# Patient Record
Sex: MALE | Race: WHITE
[De-identification: names, ages, dates, MRNs, and addresses within clinical notes are randomized per-mention and may not be internally consistent; named-entity substitution may affect disease eponyms.]

---

## 2018-07-16 ENCOUNTER — HOSPITAL ENCOUNTER (OUTPATIENT)
Dept: HOSPITAL 76 - SC | Age: 53
Discharge: HOME | End: 2018-07-16
Attending: INTERNAL MEDICINE
Payer: COMMERCIAL

## 2018-07-16 DIAGNOSIS — G47.00: ICD-10-CM

## 2018-07-16 DIAGNOSIS — G47.33: Primary | ICD-10-CM

## 2018-07-16 PROCEDURE — 99212 OFFICE O/P EST SF 10 MIN: CPT

## 2018-07-16 PROCEDURE — 99213 OFFICE O/P EST LOW 20 MIN: CPT

## 2018-10-25 ENCOUNTER — HOSPITAL ENCOUNTER (OUTPATIENT)
Dept: HOSPITAL 76 - SC | Age: 53
Discharge: HOME | End: 2018-10-25
Attending: NURSE PRACTITIONER
Payer: COMMERCIAL

## 2018-10-25 DIAGNOSIS — G47.00: ICD-10-CM

## 2018-10-25 DIAGNOSIS — G47.33: Primary | ICD-10-CM

## 2018-10-25 PROCEDURE — 99214 OFFICE O/P EST MOD 30 MIN: CPT

## 2018-10-25 PROCEDURE — 99212 OFFICE O/P EST SF 10 MIN: CPT

## 2019-12-05 ENCOUNTER — HOSPITAL ENCOUNTER (OUTPATIENT)
Dept: HOSPITAL 76 - SC | Age: 54
Discharge: HOME | End: 2019-12-05
Attending: NURSE PRACTITIONER
Payer: COMMERCIAL

## 2019-12-05 VITALS — SYSTOLIC BLOOD PRESSURE: 106 MMHG | DIASTOLIC BLOOD PRESSURE: 62 MMHG

## 2019-12-05 DIAGNOSIS — G47.33: Primary | ICD-10-CM

## 2019-12-05 DIAGNOSIS — G47.00: ICD-10-CM

## 2019-12-05 PROCEDURE — 99212 OFFICE O/P EST SF 10 MIN: CPT

## 2019-12-05 PROCEDURE — 99214 OFFICE O/P EST MOD 30 MIN: CPT

## 2019-12-05 NOTE — SLEEP CARE CONSULTATION
Information from patient questionnaire entered by Valeria White.





I have reviewed and concur with the information entered by Valeria White. This 

document represents the service I personally performed and the decisions made by

me, Taylor Walter, RN, MSN, ARNP.





History of Present Illness


Previous diagnosis: Mild, Obstructive Sleep Apnea-Hypopnea Syndrome


AHI: 11.5


Reason for follow up: annual


Accompanied by: Spouse


Equipment type: CPAP


Equipment obtained from: [facility] (difficulty getting supplies despite 

repeated attempts and not ready when told)


Mask style: Full face


Mask brand: Resmed


Backup mask available: Yes


Last cushion change: 6 weeks ago 


Prior sleep studies: Yes





CPAP Compliance Data





- Data Reviewed with Patient


Average duration of nightly device use: 8H 7M


Compliance rate %: 99.4


Current pressure setting (cmH2O): 7-11


Humidity setting: off


Heated hose setting: 3


Average residual AHI: 3.1


Average large leak: 24s





Subjective


Patient concerns: reports: nasal congestion (chronic that is not interferring  

with use of CPAP. He is aware to use saline nasal spray as needed. ), other 

(insomnia due to difficulty to maintain sleep. Wakes about 1-2 hours later, and 

scratches. It could take about 30-60 minutes to fall asleep. Clock is covered. 

He is waking 2-3 times a night. Unknown reason or feels too hot. Has room fan. 

).  denies: aerophagia, mask discomfort, air blowing in eyes, mask leak noise, 

condensation in mask/hose, dry mouth, nose, throat, epistaxis


Current pressure setting perceived as: comfortable


On therapy, patient: reports: sleeping better (cant sleep without it. ), 

awakening more refreshed, being more awake and alert during the day, more rested

overall.  denies: drowsiness while driving


Initial [place] Sleepiness Scale score: 7


Current [place] Sleepiness Scale score: 7





Allergies and Home Medications


Known drug allergies: Yes (lisinopril - a statin )


Home medication list reviewed: Yes


Allergy and home medication list: 





         Medication Name (generic/name brand)   Strength & Dosage











Losartan Potassium 100mg tab one daily


 


Lantus 50U daily


 


Metformin 1000mg tab one twice daily


 


Aspirin 81mg tab one daily


 


Novolog 15U daily


 


Jardiance 10mg daily


 


Atorvastatin  10mg HS 


 


Trulicity 3mg weekly


 


multivitamin daily '


 


zyrtec 10mg daily 





Ambien  5mg prn ( 1-2 times a week) 


 


Flonase  2 sprays each nostril daily











Review of Systems


Review of systems same as previous: Yes





Physical Exam


Blood Pressure: 106/62


Cuff size: long


Heart Rate: 76


O2 Saturation: 98


Height: 5 ft 10 in


Weight: 203 lb 6.4 oz


Body Mass Index: 29.2


BMI Classification: Overweight





Impression and Plan





1. Obstructive Sleep Apnea-Hypopnea Syndrome, mild but moderate supine, with 

good treatment compliance and good apnea control. On CPAP therapy, the patient 

has better sleep quality and is more rested overall. Patient having difficulty 

getting supplies, just [facility] and would [facility] 

[facility]. Thus a [facility] prescription will be made.  To reduce mask being 

pulled or CPAP being pulled  or hose getting around  his neck when he tosses and

turns, I showed him a hose ly that can be bought on line.   The sinus 

headaches from cold CPAP air has resolved with use of heated hose with new CPAP 

the past year. Patient's apnea severity and rationale for treatment to reduce 

apnea, improve sleep quality and reduce cardiovascular and cerebrovascular 

events was reviewed. I also reviewed the benefit of consistent device use of 

CPAP for hypertension, diabetes. Since apnea more severe supine, he is advised 

to avoid supine sleep if unable to use CPAP.


2. Insomnia,  difficulty to maintain sleep for unknown reason. Takes Ambien 5mg 

1-2 times a week. Is aware of 8 hour rule not to drive after ingesting. I also 

discussed possible adverse reactions such as sleep driving, sleep eating and 

compulsive behaviour such as gambling, buying etc which he denies is occurring. 

Thus a sleep diary will be completed over a month for evaluation upon  follow 

up.  





* Continue CPAP pressure at 7-11 cmH2O


* [facility]


* Hose ly


* Sleep diaries - 4 weeks 


* Notify me if snoring with mask or feeling that the pressure is too much or too

  little


* Attempt to lose weight - discussed how affects apnea and health 


* Return for follow up in   , or sooner if concerns arise








I spent 100% of this 35 minute visit face to face with the patient with greater 

than 50% of this was spent time counseling the patient and coordination of care.

## 2020-02-13 ENCOUNTER — HOSPITAL ENCOUNTER (OUTPATIENT)
Dept: HOSPITAL 76 - SC | Age: 55
Discharge: HOME | End: 2020-02-13
Attending: NURSE PRACTITIONER
Payer: COMMERCIAL

## 2020-02-13 VITALS — SYSTOLIC BLOOD PRESSURE: 110 MMHG | DIASTOLIC BLOOD PRESSURE: 70 MMHG

## 2020-02-13 DIAGNOSIS — G47.33: Primary | ICD-10-CM

## 2020-02-13 DIAGNOSIS — E66.3: ICD-10-CM

## 2020-02-13 PROCEDURE — 99212 OFFICE O/P EST SF 10 MIN: CPT

## 2020-02-13 PROCEDURE — 99214 OFFICE O/P EST MOD 30 MIN: CPT

## 2020-02-13 NOTE — SLEEP CARE CONSULTATION
Information from patient questionnaire entered by Rosita Evans.





I have reviewed and concur with the information entered by Rosita Evans. 

This document represents the service I personally performed and the decisions 

made by me, Taylor Walter, RN, MSN, ARNP.





History of Present Illness


Previous diagnosis: Mild, Obstructive Sleep Apnea-Hypopnea Syndrome


AHI: 11.5


Reason for follow up: other (2 month with sleep diaries)


Accompanied by: Spouse


Equipment type: CPAP


[facility]


Mask style: Full face


Mask brand: Resmed


Backup mask available: Yes


Last cushion change: 3 months ago 


HPI additional information: 


 [facility] with [facility] . He has not been able to 

get supplies from [facility] for 3 months. 


  Sleep diary completed. 


The last 2-3 weeks he has slept well and is pleased. 


Changes made since then is that he has he stopped Ambien ( only uses 

occasionally if unable to sleep well a couple of nights).


He is sleeping on his back more as noted by spouse and patient.


He continues to have left shoulder pain and under evaluation. He had a steroid 

shot 2 weeks ago with no reduction in pain noted.  MRI ordered. PT ordered to be

restarted after MRI. 





Sleep diaries reviewed, none the past few weeks as sleeping well. He still wakes

at night but now but able to fall back to sleep with no problem even if shoulder

pain.





He generally wakes about 6:30--6:45am and stay in bed until 7:15-7:30 am with 

alarm so not to disturb sleep his spouse as she states she is a light sleeper 

and she concurs.  He was sleeping later for a week and felt more refreshed and 

it appears he had more sleep ranging from  7 - 8 hours 





His bedtime is about 2320 after the news and states the news does not bother his

sleep. 





CPAP Compliance Data





- Data Reviewed with Patient


Average duration of nightly device use: 8.45


Compliance rate %: 100 (60 days)


Current pressure setting (cmH2O): 7-11


Humidity settin


Heated hose setting: 3


Average residual AHI: 3.2


Average large leak: 47 sec





Subjective


Patient concerns: reports: nasal congestion (chronic  - not interferring with 

CPAP use. In fact nasal congestion less with CPAP. ).  denies: aerophagia, mask 

discomfort, air blowing in eyes, mask leak noise, condensation in mask/hose, dry

mouth, nose, throat, epistaxis


Observed to snore while using device: No


Current pressure setting perceived as: comfortable


On therapy, patient: reports: sleeping better, awakening more refreshed, being 

more awake and alert during the day, more rested overall.  denies: drowsiness 

while driving


Initial [place] Sleepiness Scale score: 14


Current [place] Sleepiness Scale score: 4





Allergies and Home Medications


Known drug allergies: Yes (lisinopril )


Home medication list reviewed: Yes (no changes since last visit)





Review of Systems


Review of systems same as previous: Yes





Physical Exam


Blood Pressure: 110/70


Cuff size: regular


Heart Rate: 68


O2 Saturation: 98


Height: 5 ft 10 in


Weight: 200 lb 12.8 oz


Weight change since last visit: lost 3 pounds 


Body Mass Index: 28.8


BMI Classification: Overweight





Impression and Plan





1. Obstructive Sleep Apnea-Hypopnea Syndrome, mild, with good treatment 

compliance and good apnea control. On CPAP therapy, the patient has better sleep

quality and is more rested overall. To get supplies until [facility], I 

wrote an updated prescription to [facility]. He has lost weight. He is 

overweight and  planning on losing about 15 pounds which would bring his BMI 

down to 26-27 and reduce apnea risk and pressure requirements as well as benefit

his blood pressure and diabetes. His current pressure range should accomodate 

his weight loss goal. However, symptoms to report discussed  and to contact me 

if pressure needs further adjustment. Patient's apnea severity and rationale for

treatment to reduce apnea, improve sleep quality and reduce cardiovascular and 

cerebrovascular events was reviewed. I also reviewed the benefit of consistent 

device use of CPAP for hypertension, diabetes .


2. Insomnia , currently resolved for unknown reason or by product of steroid 

injection in reducing pain at night for shoulder though patient states he 

started sleeping better the week before. Thus emphasized importance of a regular

sleep schedule and sufficient sleep. Homeostatic sleep drive and circadian 

rhythm explained  and how benefits sleep efficiency. If further insomnia, then 

re-evaluation with sleep diaries as indicated. 


* 


* Continue CPAP pressure to 7-11  cmH2O


* Notify me if snoring with mask or feeling that the pressure is too much or too

  little


* Prescription to [facility] until [facility] coverage. 


* Continue to lose weight


* Call this office if any problems using CPAP


* Return for follow up in 1 year  , or sooner if concerns arise





Time Spent with Patient (minutes): 35





I spent 100% of this visit face to face with the patient with greater than 50% 

of this was spent time counseling the patient and coordination of care.

## 2021-05-23 ENCOUNTER — HOSPITAL ENCOUNTER (INPATIENT)
Dept: HOSPITAL 76 - ED | Age: 56
LOS: 2 days | Discharge: HOME | DRG: 639 | End: 2021-05-25
Attending: SPECIALIST | Admitting: INTERNAL MEDICINE
Payer: COMMERCIAL

## 2021-05-23 DIAGNOSIS — K59.00: ICD-10-CM

## 2021-05-23 DIAGNOSIS — Z90.5: ICD-10-CM

## 2021-05-23 DIAGNOSIS — E78.00: ICD-10-CM

## 2021-05-23 DIAGNOSIS — Z87.448: ICD-10-CM

## 2021-05-23 DIAGNOSIS — R10.13: ICD-10-CM

## 2021-05-23 DIAGNOSIS — Z79.899: ICD-10-CM

## 2021-05-23 DIAGNOSIS — Z80.51: ICD-10-CM

## 2021-05-23 DIAGNOSIS — E11.10: Primary | ICD-10-CM

## 2021-05-23 DIAGNOSIS — Z87.891: ICD-10-CM

## 2021-05-23 DIAGNOSIS — Z79.4: ICD-10-CM

## 2021-05-23 DIAGNOSIS — Z79.82: ICD-10-CM

## 2021-05-23 DIAGNOSIS — K21.9: ICD-10-CM

## 2021-05-23 DIAGNOSIS — E11.43: ICD-10-CM

## 2021-05-23 DIAGNOSIS — G47.30: ICD-10-CM

## 2021-05-23 DIAGNOSIS — K31.84: ICD-10-CM

## 2021-05-23 LAB
ALBUMIN DIAFP-MCNC: 4.1 G/DL (ref 3.2–5.5)
ALBUMIN/GLOB SERPL: 1 {RATIO} (ref 1–2.2)
ALP SERPL-CCNC: 86 IU/L (ref 42–121)
ALT SERPL W P-5'-P-CCNC: 19 IU/L (ref 10–60)
AMPHET UR QL SCN: NEGATIVE
ANION GAP SERPL CALCULATED.4IONS-SCNC: 14 MMOL/L (ref 6–13)
ANION GAP SERPL CALCULATED.4IONS-SCNC: 18 MMOL/L (ref 6–13)
ANION GAP SERPL CALCULATED.4IONS-SCNC: 21 MMOL/L (ref 6–13)
ANION GAP SERPL CALCULATED.4IONS-SCNC: 23 MMOL/L (ref 6–13)
AST SERPL W P-5'-P-CCNC: 13 IU/L (ref 10–42)
B PARAPERT DNA SPEC QL NAA+PROBE: NOT DETECTED
B PERT DNA SPEC QL NAA+PROBE: NOT DETECTED
BARBITURATES UR QL SCN>300 NG/ML: NEGATIVE
BASE EXCESS BLDV CALC-SCNC: -17.2 MMOL/L
BASE EXCESS BLDV CALC-SCNC: -23 MMOL/L
BASOPHILS NFR BLD AUTO: 0 10^3/UL (ref 0–0.1)
BASOPHILS NFR BLD AUTO: 0.3 %
BENZODIAZ UR QL SCN: NEGATIVE
BILIRUB BLD-MCNC: 2 MG/DL (ref 0.2–1)
BILIRUB UR QL CFM: NEGATIVE
BUN SERPL-MCNC: 35 MG/DL (ref 6–20)
BUN SERPL-MCNC: 36 MG/DL (ref 6–20)
BUN SERPL-MCNC: 38 MG/DL (ref 6–20)
BUN SERPL-MCNC: 39 MG/DL (ref 6–20)
C PNEUM DNA NPH QL NAA+NON-PROBE: NOT DETECTED
CALCIUM UR-MCNC: 10.2 MG/DL (ref 8.5–10.3)
CALCIUM UR-MCNC: 9 MG/DL (ref 8.5–10.3)
CALCIUM UR-MCNC: 9.3 MG/DL (ref 8.5–10.3)
CALCIUM UR-MCNC: 9.6 MG/DL (ref 8.5–10.3)
CASTS URNS QL MICRO: (no result) /LPF
CHLORIDE SERPL-SCNC: 102 MMOL/L (ref 101–111)
CHLORIDE SERPL-SCNC: 106 MMOL/L (ref 101–111)
CHLORIDE SERPL-SCNC: 107 MMOL/L (ref 101–111)
CHLORIDE SERPL-SCNC: 109 MMOL/L (ref 101–111)
CLARITY UR REFRACT.AUTO: (no result)
CO2 BLDV-SCNC: 8 MMOL/L (ref 24–29)
CO2 BLDV-SCNC: 9.6 MMOL/L (ref 24–29)
CO2 SERPL-SCNC: 6 MMOL/L (ref 21–32)
CO2 SERPL-SCNC: 8 MMOL/L (ref 21–32)
COCAINE UR-SCNC: NEGATIVE UMOL/L
CREAT SERPLBLD-SCNC: 1.1 MG/DL (ref 0.6–1.2)
CREAT SERPLBLD-SCNC: 1.2 MG/DL (ref 0.6–1.2)
CREAT SERPLBLD-SCNC: 1.3 MG/DL (ref 0.6–1.2)
CREAT SERPLBLD-SCNC: 1.5 MG/DL (ref 0.6–1.2)
EOSINOPHIL # BLD AUTO: 0 10^3/UL (ref 0–0.7)
EOSINOPHIL NFR BLD AUTO: 0 %
ERYTHROCYTE [DISTWIDTH] IN BLOOD BY AUTOMATED COUNT: 13.2 % (ref 12–15)
FLUAV RNA RESP QL NAA+PROBE: NOT DETECTED
GFRSERPLBLD MDRD-ARVRAT: 48 ML/MIN/{1.73_M2} (ref 89–?)
GFRSERPLBLD MDRD-ARVRAT: 57 ML/MIN/{1.73_M2} (ref 89–?)
GFRSERPLBLD MDRD-ARVRAT: 63 ML/MIN/{1.73_M2} (ref 89–?)
GFRSERPLBLD MDRD-ARVRAT: 69 ML/MIN/{1.73_M2} (ref 89–?)
GLOBULIN SER-MCNC: 4.3 G/DL (ref 2.1–4.2)
GLUCOSE SERPL-MCNC: 166 MG/DL (ref 70–100)
GLUCOSE SERPL-MCNC: 226 MG/DL (ref 70–100)
GLUCOSE SERPL-MCNC: 322 MG/DL (ref 70–100)
GLUCOSE SERPL-MCNC: 353 MG/DL (ref 70–100)
GLUCOSE UR QL STRIP.AUTO: 500 MG/DL
HAEM INFLU B DNA SPEC QL NAA+PROBE: NOT DETECTED
HCO3 BLDMV-SCNC: 6.8 MMOL/L (ref 23–28)
HCO3 BLDMV-SCNC: 8.9 MMOL/L (ref 23–28)
HCOV 229E RNA SPEC QL NAA+PROBE: NOT DETECTED
HCOV HKU1 RNA UPPER RESP QL NAA+PROBE: NOT DETECTED
HCOV NL63 RNA ASPIRATE QL NAA+PROBE: NOT DETECTED
HCOV OC43 RNA SPEC QL NAA+PROBE: NOT DETECTED
HCT VFR BLD AUTO: 46.2 % (ref 42–52)
HGB UR QL STRIP: 14.8 G/DL (ref 14–18)
HMPV AG SPEC QL: NOT DETECTED
HPIV1 RNA NPH QL NAA+PROBE: NOT DETECTED
HPIV2 SPEC QL CULT: NOT DETECTED
HPIV3 AB TITR SER CF: NOT DETECTED {TITER}
HPIV4 RNA SPEC QL NAA+PROBE: NOT DETECTED
KETONES SERPL STRIP-SCNC: (no result) MMOL/L
KETONES SERPL STRIP-SCNC: (no result) MMOL/L
KETONES UR QL STRIP.AUTO: >=80 MG/DL
LIPASE SERPL-CCNC: 71 U/L (ref 22–51)
LYMPHOCYTES # SPEC AUTO: 0.9 10^3/UL (ref 1.5–3.5)
LYMPHOCYTES NFR BLD AUTO: 7.5 %
M PNEUMO DNA SPEC QL NAA+PROBE: NOT DETECTED
MAGNESIUM SERPL-MCNC: 2.3 MG/DL (ref 1.7–2.8)
MAGNESIUM SERPL-MCNC: 2.5 MG/DL (ref 1.7–2.8)
MAGNESIUM SERPL-MCNC: 2.6 MG/DL (ref 1.7–2.8)
MAGNESIUM SERPL-MCNC: 2.7 MG/DL (ref 1.7–2.8)
MCH RBC QN AUTO: 28 PG (ref 27–31)
MCHC RBC AUTO-ENTMCNC: 32 G/DL (ref 32–36)
MCV RBC AUTO: 87.5 FL (ref 80–94)
METHADONE UR QL SCN: NEGATIVE
METHAMPHET UR QL SCN: NEGATIVE
MONOCYTES # BLD AUTO: 0.9 10^3/UL (ref 0–1)
MONOCYTES NFR BLD AUTO: 7.3 %
NEUTROPHILS # BLD AUTO: 9.9 10^3/UL (ref 1.5–6.6)
NEUTROPHILS # SNV AUTO: 11.7 X10^3/UL (ref 4.8–10.8)
NEUTROPHILS NFR BLD AUTO: 84.5 %
NITRITE UR QL STRIP.AUTO: NEGATIVE
NRBC # BLD AUTO: 0 /100WBC
NRBC # BLD AUTO: 0 X10^3/UL
OPIATES UR QL SCN: POSITIVE
PCO2 BLDV: 23.1 MMHG (ref 41–51)
PCO2 BLDV: 23.4 MMHG (ref 41–51)
PDW BLD AUTO: 10.2 FL (ref 7.4–11.4)
PH BLDV: 7.07 [PH] (ref 7.31–7.41)
PH BLDV: 7.2 [PH] (ref 7.31–7.41)
PH UR STRIP.AUTO: 5 PH (ref 5–7.5)
PHOSPHATE BLD-MCNC: 1.6 MG/DL (ref 2.5–4.6)
PLATELET # BLD: 402 10^3/UL (ref 130–450)
PO2 BLDV: 52 MMHG (ref 25–47)
PO2 BLDV: 65.7 MMHG (ref 25–47)
POTASSIUM SERPL-SCNC: 3.9 MMOL/L (ref 3.5–5)
POTASSIUM SERPL-SCNC: 3.9 MMOL/L (ref 3.5–5)
POTASSIUM SERPL-SCNC: 4.5 MMOL/L (ref 3.5–5)
POTASSIUM SERPL-SCNC: 4.8 MMOL/L (ref 3.5–5)
PROT SPEC-MCNC: 8.4 G/DL (ref 6.7–8.2)
PROT UR STRIP.AUTO-MCNC: (no result) MG/DL
RBC # UR STRIP.AUTO: (no result) /UL
RBC # URNS HPF: (no result) /HPF (ref 0–5)
RBC MAR: 5.28 10^6/UL (ref 4.7–6.1)
RSV RNA RESP QL NAA+PROBE: NOT DETECTED
RV+EV RNA SPEC QL NAA+PROBE: NOT DETECTED
SAO2 DF BLDV: 72 % (ref 60–80)
SAO2 DF BLDV: 92.7 % (ref 60–80)
SARS-COV-2 RNA PNL SPEC NAA+PROBE: NOT DETECTED
SODIUM SERPLBLD-SCNC: 129 MMOL/L (ref 135–145)
SODIUM SERPLBLD-SCNC: 133 MMOL/L (ref 135–145)
SODIUM SERPLBLD-SCNC: 133 MMOL/L (ref 135–145)
SODIUM SERPLBLD-SCNC: 135 MMOL/L (ref 135–145)
SP GR UR STRIP.AUTO: >=1.03 (ref 1–1.03)
SQUAMOUS URNS QL MICRO: (no result)
THC UR QL SCN: NEGATIVE
UROBILINOGEN UR QL STRIP.AUTO: (no result) E.U./DL
UROBILINOGEN UR STRIP.AUTO-MCNC: NEGATIVE MG/DL
VOLATILE DRUGS POS SERPL SCN: (no result)
WBC # UR MANUAL: (no result) /HPF (ref 0–3)

## 2021-05-23 PROCEDURE — 83880 ASSAY OF NATRIURETIC PEPTIDE: CPT

## 2021-05-23 PROCEDURE — 0202U NFCT DS 22 TRGT SARS-COV-2: CPT

## 2021-05-23 PROCEDURE — 82040 ASSAY OF SERUM ALBUMIN: CPT

## 2021-05-23 PROCEDURE — 82009 KETONE BODYS QUAL: CPT

## 2021-05-23 PROCEDURE — 83690 ASSAY OF LIPASE: CPT

## 2021-05-23 PROCEDURE — 82150 ASSAY OF AMYLASE: CPT

## 2021-05-23 PROCEDURE — 85379 FIBRIN DEGRADATION QUANT: CPT

## 2021-05-23 PROCEDURE — 36415 COLL VENOUS BLD VENIPUNCTURE: CPT

## 2021-05-23 PROCEDURE — 82947 ASSAY GLUCOSE BLOOD QUANT: CPT

## 2021-05-23 PROCEDURE — 82803 BLOOD GASES ANY COMBINATION: CPT

## 2021-05-23 PROCEDURE — 85025 COMPLETE CBC W/AUTO DIFF WBC: CPT

## 2021-05-23 PROCEDURE — 81001 URINALYSIS AUTO W/SCOPE: CPT

## 2021-05-23 PROCEDURE — 84100 ASSAY OF PHOSPHORUS: CPT

## 2021-05-23 PROCEDURE — 80306 DRUG TEST PRSMV INSTRMNT: CPT

## 2021-05-23 PROCEDURE — 87150 DNA/RNA AMPLIFIED PROBE: CPT

## 2021-05-23 PROCEDURE — 84132 ASSAY OF SERUM POTASSIUM: CPT

## 2021-05-23 PROCEDURE — 87086 URINE CULTURE/COLONY COUNT: CPT

## 2021-05-23 PROCEDURE — 80053 COMPREHEN METABOLIC PANEL: CPT

## 2021-05-23 PROCEDURE — 82330 ASSAY OF CALCIUM: CPT

## 2021-05-23 PROCEDURE — 84478 ASSAY OF TRIGLYCERIDES: CPT

## 2021-05-23 PROCEDURE — 99285 EMERGENCY DEPT VISIT HI MDM: CPT

## 2021-05-23 PROCEDURE — 83735 ASSAY OF MAGNESIUM: CPT

## 2021-05-23 PROCEDURE — 83036 HEMOGLOBIN GLYCOSYLATED A1C: CPT

## 2021-05-23 PROCEDURE — 96374 THER/PROPH/DIAG INJ IV PUSH: CPT

## 2021-05-23 PROCEDURE — 71045 X-RAY EXAM CHEST 1 VIEW: CPT

## 2021-05-23 PROCEDURE — 87040 BLOOD CULTURE FOR BACTERIA: CPT

## 2021-05-23 PROCEDURE — 93005 ELECTROCARDIOGRAM TRACING: CPT

## 2021-05-23 PROCEDURE — 83605 ASSAY OF LACTIC ACID: CPT

## 2021-05-23 PROCEDURE — 84484 ASSAY OF TROPONIN QUANT: CPT

## 2021-05-23 PROCEDURE — 80048 BASIC METABOLIC PNL TOTAL CA: CPT

## 2021-05-23 RX ADMIN — SODIUM PHOSPHATE, DIBASIC, ANHYDROUS, POTASSIUM PHOSPHATE, MONOBASIC, AND SODIUM PHOSPHATE, MONOBASIC, MONOHYDRATE SCH MG: 852; 155; 130 TABLET, COATED ORAL at 23:48

## 2021-05-23 RX ADMIN — FAMOTIDINE SCH MG: 10 INJECTION INTRAVENOUS at 20:11

## 2021-05-23 RX ADMIN — POTASSIUM CHLORIDE, DEXTROSE MONOHYDRATE AND SODIUM CHLORIDE SCH MLS/HR: 150; 5; 900 INJECTION, SOLUTION INTRAVENOUS at 15:17

## 2021-05-23 RX ADMIN — SODIUM CHLORIDE, PRESERVATIVE FREE SCH ML: 5 INJECTION INTRAVENOUS at 17:20

## 2021-05-23 RX ADMIN — POTASSIUM CHLORIDE, DEXTROSE MONOHYDRATE AND SODIUM CHLORIDE SCH MLS/HR: 150; 5; 900 INJECTION, SOLUTION INTRAVENOUS at 23:48

## 2021-05-23 RX ADMIN — SODIUM CHLORIDE, PRESERVATIVE FREE SCH: 5 INJECTION INTRAVENOUS at 23:58

## 2021-05-23 RX ADMIN — INSULIN HUMAN SCH MLS/HR: 100 INJECTION, SOLUTION PARENTERAL at 20:00

## 2021-05-23 NOTE — EXTERNAL MEDICAL SUMMARY RPT
Continuity of Care Document

                             Created on:May 23, 2021



Patient:MICHAEL HAWK

Sex:Male

:1965

External Reference #:[id number]





Demographics







                          Phone                     Unavailable

 

                          Preferred Language        Unknown

 

                          Marital Status            Unknown

 

                          Gnosticism Affiliation     Unknown

 

                          Race                      Unknown

 

                          Ethnic Group              Unknown









Author







                          Organization              [facility]

 

                          [place]                    [place] [address]

 

                          Phone                     [phone]









Problems







                     date                description         facility

 

                     2021            Other specified disorders of kidney and

  [employer]



                                        ureter

## 2021-05-23 NOTE — XRAY REPORT
PROCEDURE:  Chest 1 View X-Ray

 

INDICATIONS:  Chest Pain

 

TECHNIQUE:  One view of the chest was acquired.  

 

COMPARISON:  Prior chest x-ray, 11/6/2017 Correlation is made with prior chest CT, 1/5/2014.

 

FINDINGS:  

 

Surgical changes and devices:  None.  

 

Lungs and pleura:  Low lung volumes can be seen, causing a crowded appearance to the lung markings. O
n the semiupright images, no large pneumothorax or large pleural effusions can be seen. No focal infi
ltrates are seen.  

 

Mediastinum:  Mediastinal contours appear normal.  Heart size is normal.  

 

Bones and chest wall:  No suspicious bony lesions.  There is pneumoperitoneum, with air seen inferior
 to the right hemidiaphragm.

 

 

 

IMPRESSION:  

 

Pneumoperitoneum is seen. A history is gathered of recent partial nephrectomy, which would explain th
e gas.

 

If it would be helpful for clinical management decision making, please consider a dedicated CT of the
 abdomen and pelvis for further evaluation.

 

Low lung volumes, without an acute abnormality seen within the chest.

 

Reviewed by: Hiren Henriquez MD on 5/23/2021 9:22 AM KLEVER

Approved by: Hiren Henriquez MD on 5/23/2021 9:22 AM KLEVER

 

 

Station ID:  [id number]

## 2021-05-23 NOTE — EXTERNAL MEDICAL SUMMARY RPT
Continuity of Care Document

                             Created on:May 23, 2021



Patient:MICHAEL HAWK

Sex:Male

:1965

External Reference #:[id number]





Demographics







                          Phone                     Unavailable

 

                          Preferred Language        Unknown

 

                          Marital Status            Unknown

 

                          Confucianist Affiliation     Unknown

 

                          Race                      Unknown

 

                          Ethnic Group              Unknown









Author







                          Organization              [facility]

 

                          Address                    [place] [address]

 

                          Phone                     [phone]









Problems







                     date                description         facility

 

                     2021            Other specified disorders of kidney and

  [employer]



                                        ureter

## 2021-05-23 NOTE — ED PHYSICIAN DOCUMENTATION
History of Present Illness





- Stated complaint


Stated Complaint: SOA/NOT EATING





- Chief complaint


Chief Complaint: Abd Pain





- History obtained from


History obtained from: Patient, Family (wife)





- Additonal information


Additional information: 





55-year-old man with past medical history of diabetes, hyperlipidemia, renal 

cancer status post partial nephrectomy at [facility] last Tuesday, discharged on 

Thursday, presents with decreased p.o. solid intake since discharge, progressive

confusion, and multiple episodes of nonbloody nonbilious nausea and vomiting as 

well as epigastric abdominal burning sensation.  Drank 5L of water yesterday per

wife report. denies fevers. Patient is ANO x3 on arrival.





Review of Systems


Ten Systems: 10 systems reviewed and negative


GI: reports: Abdominal Pain, Nausea, Vomiting.  denies: Diarrhea


Neurologic: reports: Confused





PD PAST MEDICAL HISTORY





- Past Medical History


Past Medical History: Yes


Cardiovascular: High cholesterol


Respiratory: Sleep apnea, CPAP use


Neuro: None


Endocrine/Autoimmune: Type 2 diabetes


GI: Diverticulitis, Other


: None, Other


HEENT: Chronic sinusitis


Psych: None


Musculoskeletal: Other


Derm: None


Other Past Medical History: kidney cancer with partial nehrectomy 5/18/21





- Past Surgical History


Past Surgical History: Yes


Ortho: Shoulder arthroplasty


Derm: Debridement





- Present Medications


Home Medications: 


                                Ambulatory Orders











 Medication  Instructions  Recorded  Confirmed


 


Aspirin [Aspir 81] 81 mg PO DAILY 01/05/14 05/23/21


 


Atorvastatin Calcium [Lipitor] 10 mg PO HS 01/05/14 05/23/21


 


Cetirizine HCl [Zyrtec] 10 mg PO DAILY 01/05/14 05/23/21


 


Metformin HCl [Metformin ER 1,000 mg PO BIDWM 01/05/14 05/23/21





Osmotic]   


 


Losartan Potassium 50 mg PO DAILY 11/06/17 05/23/21


 


Cholecalciferol [Vitamin D3] 25 mcg PO DAILY 05/23/21 05/23/21


 


Empagliflozin [Jardiance] 12.5 mg PO DAILY 05/23/21 05/23/21


 


Insulin Glargine [Lantus Solostar] 52 unit SUBQ BID 05/23/21 05/23/21


 


Multivitamin [Theragran] 1 tab ORAL DAILY 05/23/21 05/23/21


 


Ondansetron Odt [Zofran Odt] 4 mg TL Q6H PRN 05/23/21 05/23/21


 


Senna [Senokot] 8.6 mg PO BID 05/23/21 05/23/21


 


oxyCODONE/ACET 5/325 [Percocet 5 1 - 2 each PO Q4-6H 05/23/21 05/23/21





mg/325 mg]   














- Allergies


Allergies/Adverse Reactions: 


                                    Allergies











Allergy/AdvReac Type Severity Reaction Status Date / Time


 


lisinopril Allergy  Hives Verified 05/23/21 09:54














- Social History


Does the pt smoke?: No


Smoking Status: Former smoker


Does the pt drink ETOH?: No


Does the pt have substance abuse?: Yes


Substance Use and Type: CBD oil / Products





- Immunizations


Immunizations are current?: Yes





PD ED PE NORMAL





- Vitals


Vital signs reviewed: Yes





- General


General: Alert and oriented X 3, Other (visibly uncomfortable, mildly 

diaphoretic)





- HEENT


HEENT: Atraumatic, PERRL, EOMI





- Neck


Neck: Supple, no meningeal sign





- Cardiac


Cardiac: Other (Tachycardic rate, regular rhythm)





- Respiratory


Respiratory: No respiratory distress, Clear bilaterally, Other (Mildly 

tachypneic)





- Abdomen


Abdomen: Non tender, Non distended, Other (Discomfort epigastric palpation)





- Back


Back: No CVA TTP





- Derm


Derm: Other (Pale clammy)





- Extremities


Extremities: No deformity





- Neuro


Neuro: Alert and oriented X 3





- Psych


Psych: Normal mood, Normal affect





Results





- Vitals


Vitals: 


                               Vital Signs - 24 hr











  05/23/21 05/23/21 05/23/21





  09:54 10:17 10:34


 


Temperature 35.5 C L 36.5 C 


 


Heart Rate 120 H 106 H 106 H


 


Respiratory 25 H 16 16





Rate   


 


Blood Pressure 156/86 H 132/83 H 132/8 H


 


O2 Saturation 99 98 98








                                     Oxygen











O2 Source                      Room air

















- Labs


Labs: 


                                Laboratory Tests











  05/23/21 05/23/21 05/23/21





  10:05 10:05 10:05


 


WBC  11.7 H  


 


RBC  5.28  


 


Hgb  14.8  


 


Hct  46.2  


 


MCV  87.5  


 


MCH  28.0  


 


MCHC  32.0  


 


RDW  13.2  


 


Plt Count  402  


 


MPV  10.2  


 


Neut # (Auto)  9.9 H  


 


Lymph # (Auto)  0.9 L  


 


Mono # (Auto)  0.9  


 


Eos # (Auto)  0.0  


 


Baso # (Auto)  0.0  


 


Absolute Nucleated RBC  0.00  


 


Nucleated RBC %  0.0  


 


D-Dimer   


 


VBG pH   


 


VBG pCO2   


 


VBG pO2   


 


VBG HCO3   


 


VBG Total CO2   


 


VBG O2 Saturation   


 


VBG Base Excess   


 


Sodium   133 L 


 


Potassium   4.8 


 


Chloride   102 


 


Carbon Dioxide   8 L* 


 


Anion Gap   23.0 H 


 


BUN   39 H 


 


Creatinine   1.5 H 


 


Estimated GFR (MDRD)   48 L 


 


Glucose   353 H 


 


Lactic Acid   


 


Calcium   10.2 


 


Total Bilirubin   2.0 H 


 


AST   13 


 


ALT   19 


 


Alkaline Phosphatase   86 


 


Troponin I High Sens    8.4


 


B-Natriuretic Peptide   


 


Total Protein   8.4 H 


 


Albumin   4.1 


 


Globulin   4.3 H 


 


Albumin/Globulin Ratio   1.0 


 


Lipase   71 H 


 


Nasal Adenovirus (PCR)   


 


Nasal B. parapertussis DNA (PCR)   


 


Nasal Coronavir 229E PCR   


 


Nasal Coronavir HKU1 PCR   


 


Nasal Coronavir NL63 PCR   


 


Nasal Coronavir OC43 PCR   


 


Nasal Enterovir/Rhinovir PCR   


 


Nasal Influenza B PCR   


 


Nasal Influenza A PCR   


 


Nasal Parainfluen 1 PCR   


 


Nasal Parainfluen 2 PCR   


 


Nasal Parainfluen 3 PCR   


 


Nasal Parainfluen 4 PCR   


 


Nasal RSV (PCR)   


 


Nasal B.pertussis DNA PCR   


 


Nasal C.pneumoniae (PCR)   


 


Kyler Human Metapneumo PCR   


 


Nasal M.pneumoniae (PCR)   


 


Nasal SARS-CoV-2 (PCR)   


 


Serum Ketones   














  05/23/21 05/23/21 05/23/21





  10:05 10:05 10:05


 


WBC   


 


RBC   


 


Hgb   


 


Hct   


 


MCV   


 


MCH   


 


MCHC   


 


RDW   


 


Plt Count   


 


MPV   


 


Neut # (Auto)   


 


Lymph # (Auto)   


 


Mono # (Auto)   


 


Eos # (Auto)   


 


Baso # (Auto)   


 


Absolute Nucleated RBC   


 


Nucleated RBC %   


 


D-Dimer    797.0 H


 


VBG pH   7.072 L 


 


VBG pCO2   23.4 L 


 


VBG pO2   52.0 H 


 


VBG HCO3   6.8 L 


 


VBG Total CO2   8.0 L 


 


VBG O2 Saturation   72.0 


 


VBG Base Excess   -23.0 L 


 


Sodium   


 


Potassium   


 


Chloride   


 


Carbon Dioxide   


 


Anion Gap   


 


BUN   


 


Creatinine   


 


Estimated GFR (MDRD)   


 


Glucose   


 


Lactic Acid   


 


Calcium   


 


Total Bilirubin   


 


AST   


 


ALT   


 


Alkaline Phosphatase   


 


Troponin I High Sens   


 


B-Natriuretic Peptide  41  


 


Total Protein   


 


Albumin   


 


Globulin   


 


Albumin/Globulin Ratio   


 


Lipase   


 


Nasal Adenovirus (PCR)   


 


Nasal B. parapertussis DNA (PCR)   


 


Nasal Coronavir 229E PCR   


 


Nasal Coronavir HKU1 PCR   


 


Nasal Coronavir NL63 PCR   


 


Nasal Coronavir OC43 PCR   


 


Nasal Enterovir/Rhinovir PCR   


 


Nasal Influenza B PCR   


 


Nasal Influenza A PCR   


 


Nasal Parainfluen 1 PCR   


 


Nasal Parainfluen 2 PCR   


 


Nasal Parainfluen 3 PCR   


 


Nasal Parainfluen 4 PCR   


 


Nasal RSV (PCR)   


 


Nasal B.pertussis DNA PCR   


 


Nasal C.pneumoniae (PCR)   


 


Kyler Human Metapneumo PCR   


 


Nasal M.pneumoniae (PCR)   


 


Nasal SARS-CoV-2 (PCR)   


 


Serum Ketones   














  05/23/21 05/23/21 05/23/21





  10:05 10:45 10:45


 


WBC   


 


RBC   


 


Hgb   


 


Hct   


 


MCV   


 


MCH   


 


MCHC   


 


RDW   


 


Plt Count   


 


MPV   


 


Neut # (Auto)   


 


Lymph # (Auto)   


 


Mono # (Auto)   


 


Eos # (Auto)   


 


Baso # (Auto)   


 


Absolute Nucleated RBC   


 


Nucleated RBC %   


 


D-Dimer   


 


VBG pH   


 


VBG pCO2   


 


VBG pO2   


 


VBG HCO3   


 


VBG Total CO2   


 


VBG O2 Saturation   


 


VBG Base Excess   


 


Sodium   


 


Potassium   


 


Chloride   


 


Carbon Dioxide   


 


Anion Gap   


 


BUN   


 


Creatinine   


 


Estimated GFR (MDRD)   


 


Glucose   


 


Lactic Acid   1.7 


 


Calcium   


 


Total Bilirubin   


 


AST   


 


ALT   


 


Alkaline Phosphatase   


 


Troponin I High Sens   


 


B-Natriuretic Peptide   


 


Total Protein   


 


Albumin   


 


Globulin   


 


Albumin/Globulin Ratio   


 


Lipase   


 


Nasal Adenovirus (PCR)    NOT DETECTED


 


Nasal B. parapertussis DNA (PCR)    NOT DETECTED


 


Nasal Coronavir 229E PCR    NOT DETECTED


 


Nasal Coronavir HKU1 PCR    NOT DETECTED


 


Nasal Coronavir NL63 PCR    NOT DETECTED


 


Nasal Coronavir OC43 PCR    NOT DETECTED


 


Nasal Enterovir/Rhinovir PCR    NOT DETECTED


 


Nasal Influenza B PCR    NOT DETECTED


 


Nasal Influenza A PCR    NOT DETECTED


 


Nasal Parainfluen 1 PCR    NOT DETECTED


 


Nasal Parainfluen 2 PCR    NOT DETECTED


 


Nasal Parainfluen 3 PCR    NOT DETECTED


 


Nasal Parainfluen 4 PCR    NOT DETECTED


 


Nasal RSV (PCR)    NOT DETECTED


 


Nasal B.pertussis DNA PCR    NOT DETECTED


 


Nasal C.pneumoniae (PCR)    NOT DETECTED


 


Kyler Human Metapneumo PCR    NOT DETECTED


 


Nasal M.pneumoniae (PCR)    NOT DETECTED


 


Nasal SARS-CoV-2 (PCR)    NOT DETECTED


 


Serum Ketones  LARGE H  














PD MEDICAL DECISION MAKING





- ED course


ED course: 





Patient in DKA.  Endorsed to hospitalist for ICU admission.





Departure





- Departure


Disposition: [facility] DC/Xfer


Clinical Impression: 


 DKA (diabetic ketoacidoses)





Discharge Date/Time: 05/23/21 11:50

## 2021-05-23 NOTE — HISTORY & PHYSICAL EXAMINATION
Chief Complaint





- Chief Complaint


Chief Complaint: N/V, confused





History of Present Illness





- Admitted From


Admitted From:: ED





- History Obtained From


History obtained from: ED provider and patient





- History of Present Illness


HPI Comment/Other: 





This is a 55-year-old WM with history of DM type 2 on insulin and oral agents, 

with recently diagnosed renal mass who underwent partial nephrectomy at [facility] about 5 days ago.  He was discharged 2 days ago.  He has only 

been drinking liquids over these 2 days, having abdominal pain and also nausea 

and vomiting, not eating any solids.  He became more confused today and the wife

brought him to the emergency room.  By work-up in the ER, he has been diagnosed 

with DKA with serum pH of 7.07, elevated anion gap of 23, large ketones and a 

serum glucose level of 353.  He has never had DKA before. His CXR shows p

neumoperitoneum, likely related to his recent surgery.  He is being admitted to 

the ICU for DKA protocol.








History





- Past Medical History


Cardiovascular: reports: High cholesterol


Respiratory: reports: Sleep apnea, CPAP use


Neuro: reports: None


Endocrine/Autoimmune: reports: Type 2 diabetes


GI: reports: Diverticulitis, Other (GERD vs gastroparesis)


: reports: None, Other


HEENT: reports: Chronic sinusitis


Psych: reports: None


Derm: reports: None


MRSA Hx?: No


Other Past Medical History: kidney cancer with partial nephrectomy 21





- Past Surgical History


General: reports: Other (Partial nephrectomy)


Ortho: reports: Shoulder arthroplasty


Derm: reports: Debridement





- Family & Social History


Family History: Mother:  (Mother  of renal cancer, father  of 

MI), Father: , Other family: Alive and Well (2 adult children are alive 

and well)


Living arrangement: At home


Living Situation: With spouse/s.o.


Social History Notes: Patient is retired from the [employer] several years.  He does 

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





- Substance History


Use: Uses substance without health or social issues: Cannabis, Other (Patient 

quit smoking cigarettes 30 years ago.  Patient drinks very rare beer in the 

summer.)





Meds/Allgy





- Home Medications


Home Medications: 


                                Ambulatory Orders











 Medication  Instructions  Recorded  Confirmed


 


Aspirin [Aspir 81] 81 mg PO DAILY 14


 


Atorvastatin Calcium [Lipitor] 10 mg PO HS 14


 


Cetirizine HCl [Zyrtec] 10 mg PO DAILY 14


 


Metformin HCl [Metformin ER 1,000 mg PO BIDWM 14





Osmotic]   


 


Losartan Potassium 50 mg PO DAILY 17


 


Cholecalciferol [Vitamin D3] 25 mcg PO DAILY 21


 


Empagliflozin [Jardiance] 12.5 mg PO DAILY 21


 


Insulin Glargine [Lantus Solostar] 52 unit SUBQ BID 21


 


Multivitamin [Theragran] 1 tab ORAL DAILY 21


 


Ondansetron Odt [Zofran Odt] 4 mg TL Q6H PRN 21


 


Senna [Senokot] 8.6 mg PO BID 21


 


oxyCODONE/ACET 5/325 [Percocet 5 1 - 2 each PO Q4-6H 21





mg/325 mg]   














- Allergies


Allergies/Adverse Reactions: 


                                    Allergies











Allergy/AdvReac Type Severity Reaction Status Date / Time


 


lisinopril Allergy  Hives Verified 21 09:54














Review of Systems





- Constitutional


Constitutional: reports: Poor appetite





- Eyes


Eyes: reports: Other (Unequal gaze noted)





- Gastrointestinal


Gastrointestinal: reports: Abdominal pain, Nausea, Vomiting, Reflux/heartburn 

(He reports many months of acidy epigastric pain.  The only thing that has 

helped it is vaping marijuana.)





- Neurological


Neurological: reports: Other (Confused since last night)





- Hematologic/Lymphatic


Hematologic/Lymphatic: reports: Other (The renal mass Bx from 21 is still 

pending)





- All Other Systems


All Other Systems: reports: Reviewed and negative





Exam





- Vital Signs


Vital Signs: 





                                Vital Signs x48h











  Temp Pulse Resp BP Pulse Ox


 


 21 10:34   106 H  16  132/8 H  98


 


 21 10:17  36.5 C  106 H  16  132/83 H  98


 


 21 09:54  35.5 C L  120 H  25 H  156/86 H  99














- Physical Exam


General Appearance: positive: No acute distress, Alert


Eyes Bilateral: positive: Other (Unequal gaze)


ENT: positive: Dry mucous membranes


Neck: positive: Nml inspection, No JVD


Respiratory: positive: No respiratory distress, Breath sounds nml


Cardiovascular: positive: Regular rate & rhythm, No murmur


Abdomen: positive: Non-tender, Nml bowel sounds, No distention


Skin: positive: No rash, Warm, Dry


Extremities: positive: No pedal edema


Neurologic/Psychiatric: positive: Oriented x3.  negative: Other (Speech is 

normal, but repeats himself)





Conclusion/Plan





- Problem List


(1) DKA (diabetic ketoacidoses)


Conclusion/Plan: 


The etiology of his DKA is probably from the stress of the recent significant 

intra-abdominal surgery, vomiting post-op and in addition there have been 3-4 

months of "stomach upset" with occaisional vomiting  which the wife thought was 

from being on Ozempic.


Discharge summary from [facility] states that he had "stabilized 

with advancement of diet, passing flatus and having a BM" however the wife 

reports that he never had a BM there and was vomiting throughout the entire 

stay, it had never improved.  He was given water which he vomited, chicken broth

he vomited and applesauce he vomited.  She thinks he was discharged too early.


Will admit him into the ICU and start management on a DKA protocol including 

n.p.o. except ice chips, antiemetics, IV fluids, IV insulin, following his anion

gap, serum ketones, serum glucose closely.


Transition to a diet when possible and off the insulin drip when DKA clears.








(2) Abdominal pain


Conclusion/Plan: 


This has been ongoing for 3 to 4 months associated with intermittent nausea.  He

describes it as "acid you burning".  The wife thinks it is a side effect of his 

Ozempic.


Because of presence of vomiting in a diabetic, he may now be getting diabetic 

gastroparesis.  The patient and wife state that the patient's brother who has 

diabetes already has gastroparesis.


We will try scheduled Reglan when meals starte, and now prn Zofran and prn 

Compazine.


Will order meds for ulcer prophylaxis.


Qualifiers: 


   Abdominal location: epigastric   Qualified Code(s): R10.13 - Epigastric pain 

 





(3) H/O partial nephrectomy


Conclusion/Plan: 


This is very likely the reason for seeing free air in the peritoneum.


We obtained discharge summary and operative note from [facility].  The Bx result is 

still pending.


We will continue with pain meds prn for postop pain.








(4) Renal mass of unknown nature


Conclusion/Plan: 


Bx result has not returned yet, malignancy has not been confirmed.








(5) Medical marijuana use


Conclusion/Plan: 


Patient would vape at 5 PM, 8 PM and bedtime, for the last 2 months.  This was 

the only thing that "helped his epigastric acid a burning pain"








(6) Sleep apnea with use of continuous positive airway pressure (CPAP)


Conclusion/Plan: 


Will order use of his home CPAP machine while here








- Lab Results


Fish Bones: 


                                 21 10:05





                                 21 14:05

## 2021-05-23 NOTE — PHARMACY PROGRESS NOTE
- Best Possible Medication History


Admit Date and Time: 05/23/21 1103


Processed by: Nursing


Medication History completed: Yes





As the person ultimately responsible for medication therapy, providers are able 

to order a medication from an existing home medication list in [facility] via the 

"Reconcile Routine" prior to Confirmation of that medication by support staff. 

Such practice is discouraged except when the physician, in their clinical 

judgment, deems that a medical need exists for a medication without regard to 

previous use.

## 2021-05-24 LAB
AMYLASE SERPL-CCNC: 71 U/L (ref 28–100)
ANION GAP SERPL CALCULATED.4IONS-SCNC: 6 MMOL/L (ref 6–13)
BASOPHILS NFR BLD AUTO: 0.1 10^3/UL (ref 0–0.1)
BASOPHILS NFR BLD AUTO: 0.9 %
BUN SERPL-MCNC: 23 MG/DL (ref 6–20)
CA-I SERPL ISE-MCNC: 1.27 MMOL/L (ref 1.15–1.33)
CALCIUM UR-MCNC: 8.5 MG/DL (ref 8.5–10.3)
CHLORIDE SERPL-SCNC: 114 MMOL/L (ref 101–111)
CO2 SERPL-SCNC: 15 MMOL/L (ref 21–32)
CREAT SERPLBLD-SCNC: 0.8 MG/DL (ref 0.6–1.2)
EOSINOPHIL # BLD AUTO: 0.1 10^3/UL (ref 0–0.7)
EOSINOPHIL NFR BLD AUTO: 1.3 %
ERYTHROCYTE [DISTWIDTH] IN BLOOD BY AUTOMATED COUNT: 12.9 % (ref 12–15)
EST. AVERAGE GLUCOSE BLD GHB EST-MCNC: 157 MG/DL (ref 70–100)
GFRSERPLBLD MDRD-ARVRAT: 100 ML/MIN/{1.73_M2} (ref 89–?)
GLUCOSE SERPL-MCNC: 134 MG/DL (ref 70–100)
HBA1C MFR BLD HPLC: 7.1 % (ref 4.27–6.07)
HCT VFR BLD AUTO: 32.9 % (ref 42–52)
HGB UR QL STRIP: 11.3 G/DL (ref 14–18)
KETONES SERPL STRIP-SCNC: (no result) MMOL/L
LIPASE SERPL-CCNC: 55 U/L (ref 22–51)
LYMPHOCYTES # SPEC AUTO: 1 10^3/UL (ref 1.5–3.5)
LYMPHOCYTES NFR BLD AUTO: 17.2 %
MAGNESIUM SERPL-MCNC: 2.2 MG/DL (ref 1.7–2.8)
MCH RBC QN AUTO: 28.5 PG (ref 27–31)
MCHC RBC AUTO-ENTMCNC: 34.3 G/DL (ref 32–36)
MCV RBC AUTO: 82.9 FL (ref 80–94)
MONOCYTES # BLD AUTO: 0.6 10^3/UL (ref 0–1)
MONOCYTES NFR BLD AUTO: 10.9 %
NEUTROPHILS # BLD AUTO: 3.8 10^3/UL (ref 1.5–6.6)
NEUTROPHILS # SNV AUTO: 5.5 X10^3/UL (ref 4.8–10.8)
NEUTROPHILS NFR BLD AUTO: 69.3 %
NRBC # BLD AUTO: 0 /100WBC
NRBC # BLD AUTO: 0 X10^3/UL
PDW BLD AUTO: 9.7 FL (ref 7.4–11.4)
PH BLDV: 7.26 [PH] (ref 7.31–7.41)
PHOSPHATE BLD-MCNC: 1.2 MG/DL (ref 2.5–4.6)
PHOSPHATE BLD-MCNC: 1.6 MG/DL (ref 2.5–4.6)
PLATELET # BLD: 210 10^3/UL (ref 130–450)
POTASSIUM SERPL-SCNC: 3.2 MMOL/L (ref 3.5–5)
POTASSIUM SERPL-SCNC: 3.5 MMOL/L (ref 3.5–5)
POTASSIUM SERPL-SCNC: 3.7 MMOL/L (ref 3.5–5)
RBC MAR: 3.97 10^6/UL (ref 4.7–6.1)
SODIUM SERPLBLD-SCNC: 135 MMOL/L (ref 135–145)
TRIGL P FAST SERPL-MCNC: 163 MG/DL

## 2021-05-24 RX ADMIN — SODIUM PHOSPHATE, DIBASIC, ANHYDROUS, POTASSIUM PHOSPHATE, MONOBASIC, AND SODIUM PHOSPHATE, MONOBASIC, MONOHYDRATE SCH MG: 852; 155; 130 TABLET, COATED ORAL at 18:53

## 2021-05-24 RX ADMIN — INSULIN ASPART SCH UNIT: 100 INJECTION, SOLUTION INTRAVENOUS; SUBCUTANEOUS at 20:58

## 2021-05-24 RX ADMIN — INSULIN HUMAN SCH MLS/HR: 100 INJECTION, SOLUTION PARENTERAL at 16:09

## 2021-05-24 RX ADMIN — METOCLOPRAMIDE SCH MG: 10 TABLET ORAL at 20:41

## 2021-05-24 RX ADMIN — SODIUM PHOSPHATE, DIBASIC, ANHYDROUS, POTASSIUM PHOSPHATE, MONOBASIC, AND SODIUM PHOSPHATE, MONOBASIC, MONOHYDRATE SCH MG: 852; 155; 130 TABLET, COATED ORAL at 17:20

## 2021-05-24 RX ADMIN — FAMOTIDINE SCH MG: 10 INJECTION INTRAVENOUS at 20:41

## 2021-05-24 RX ADMIN — SODIUM CHLORIDE, PRESERVATIVE FREE SCH ML: 5 INJECTION INTRAVENOUS at 16:09

## 2021-05-24 RX ADMIN — HYDROMORPHONE HYDROCHLORIDE PRN MG: 1 INJECTION, SOLUTION INTRAMUSCULAR; INTRAVENOUS; SUBCUTANEOUS at 22:23

## 2021-05-24 RX ADMIN — POTASSIUM CHLORIDE, DEXTROSE MONOHYDRATE AND SODIUM CHLORIDE SCH MLS/HR: 150; 5; 900 INJECTION, SOLUTION INTRAVENOUS at 22:17

## 2021-05-24 RX ADMIN — INSULIN ASPART SCH: 100 INJECTION, SOLUTION INTRAVENOUS; SUBCUTANEOUS at 17:14

## 2021-05-24 RX ADMIN — POTASSIUM CHLORIDE, DEXTROSE MONOHYDRATE AND SODIUM CHLORIDE SCH MLS/HR: 150; 5; 900 INJECTION, SOLUTION INTRAVENOUS at 16:36

## 2021-05-24 RX ADMIN — HYDROMORPHONE HYDROCHLORIDE PRN MG: 1 INJECTION, SOLUTION INTRAMUSCULAR; INTRAVENOUS; SUBCUTANEOUS at 16:37

## 2021-05-24 RX ADMIN — SODIUM PHOSPHATE, DIBASIC, ANHYDROUS, POTASSIUM PHOSPHATE, MONOBASIC, AND SODIUM PHOSPHATE, MONOBASIC, MONOHYDRATE SCH MG: 852; 155; 130 TABLET, COATED ORAL at 01:26

## 2021-05-24 RX ADMIN — LORATADINE SCH MG: 10 TABLET ORAL at 08:12

## 2021-05-24 RX ADMIN — INSULIN HUMAN SCH: 100 INJECTION, SOLUTION PARENTERAL at 11:43

## 2021-05-24 RX ADMIN — HYDROMORPHONE HYDROCHLORIDE PRN MG: 1 INJECTION, SOLUTION INTRAMUSCULAR; INTRAVENOUS; SUBCUTANEOUS at 19:49

## 2021-05-24 RX ADMIN — SODIUM CHLORIDE, PRESERVATIVE FREE SCH ML: 5 INJECTION INTRAVENOUS at 19:51

## 2021-05-24 RX ADMIN — HYDROMORPHONE HYDROCHLORIDE PRN MG: 1 INJECTION, SOLUTION INTRAMUSCULAR; INTRAVENOUS; SUBCUTANEOUS at 09:57

## 2021-05-24 RX ADMIN — FAMOTIDINE SCH MG: 10 INJECTION INTRAVENOUS at 08:12

## 2021-05-24 RX ADMIN — METOCLOPRAMIDE SCH MG: 10 TABLET ORAL at 16:08

## 2021-05-24 RX ADMIN — SODIUM CHLORIDE, PRESERVATIVE FREE SCH ML: 5 INJECTION INTRAVENOUS at 08:12

## 2021-05-24 RX ADMIN — METOCLOPRAMIDE SCH MG: 10 TABLET ORAL at 12:24

## 2021-05-24 RX ADMIN — SODIUM CHLORIDE, PRESERVATIVE FREE PRN ML: 5 INJECTION INTRAVENOUS at 22:23

## 2021-05-24 RX ADMIN — SODIUM CHLORIDE, PRESERVATIVE FREE PRN ML: 5 INJECTION INTRAVENOUS at 16:38

## 2021-05-24 RX ADMIN — METOCLOPRAMIDE SCH MG: 10 TABLET ORAL at 06:20

## 2021-05-24 NOTE — PROVIDER PROGRESS NOTE
Assessment/Plan





- Problem List


(1) DKA (diabetic ketoacidoses)


Assessment/Plan: 


The etiology of his DKA is probably from the stress of the recent significant 

intra-abdominal surgery, vomiting post-op and in addition there have been 3-4 

months of "stomach upset" with occaisional vomiting  which the wife thought was 

from being on Ozempic.  Discharge summary from [facility] states 

that he had "stabilized with advancement of diet, passing flatus and having a 

BM" however the wife reports that he never had a BM there and was vomiting 

throughout the entire stay, it had never improved: He was given water which he 

vomited, chicken broth which he vomited and applesauce he vomited.  She thinks ra carreno was discharged too early.


He has been on an insulin drip since admission.  His electrolytes have improved,

anion gap has decreased.


Serum ketones are still testing as present but are SMALL.


Continue with IV insulin until he clears his serum ketones. Then, can transfer 

out of ICU.


Treat with antiemetics as needed, Reglan AC scheduled, advance his diabetic diet

as tolerated.








(2) Abdominal pain


Qualifiers: 


   Abdominal location: epigastric   Qualified Code(s): R10.13 - Epigastric pain 

 


Assessment/Plan: 


This has been ongoing for 3 to 4 months associated with intermittent nausea.  He

describes it as "acidy burning".  The wife thinks it is a side effect of his 

Ozempic.


Because of presence of vomiting in a diabetic, he may now have diabetic 

gastroparesis.  The patient and wife state that the patient's brother who has 

diabetes already has gastroparesis and behaves "like this".


Ordered scheduled Reglan before meals started this morning, and also he is on 

prn Zofran and prn Compazine.


Will advance diet as tolerated.


Will order meds for ulcer prophylaxis.








(3) H/O partial nephrectomy


Assessment/Plan: 


He had surgery 4 days ago.  This is very likely the reason for seeing free air 

in the peritoneum on CXR


We obtained discharge summary and operative note from [facility].  The partial 

nephrectomy pathology result is still pending; it is not confirmed to be a can

cer yet.


We will continue with pain meds prn using Dilaudid, for postop pain, since he 

had po Dilaudid prescribed by the Urologist who did recent surgery.








(4) Renal mass of unknown nature


Assessment/Plan: 


Bx result has not returned yet, malignancy has not been confirmed.








(5) Medical marijuana use


Assessment/Plan: 


Patient dstated he vapes marijuana at 5 PM, 8 PM and bedtime, for the last 2 

months.  This was the only thing that "helped his epigastric acidy burning pain"

and gave him an appetite.








(6) Sleep apnea with use of continuous positive airway pressure (CPAP)


Assessment/Plan: 


We ordered use of his home CPAP machine while here.








- Current Meds


Current Meds: 





                               Current Medications











Generic Name Dose Route Start Last Admin





  Trade Name Freq  PRN Reason Stop Dose Admin


 


Famotidine  20 mg  05/23/21 21:00  05/24/21 08:12





  Famotidine 20 Mg/2 Ml Vial  IVP   20 mg





  BID RASHIDA   Administration


 


Hydromorphone HCl  1 mg  05/24/21 09:26  05/24/21 09:57





  Hydromorphone 1 Mg/Ml Carpuject  IVP   1 mg





  Q2HR PRN   Administration





  PAIN  


 


Insulin Human Regular 100 unit  100 mls @ 8 mls/hr  05/23/21 22:00  05/24/21 

13:24





  / Sodium Chloride  IV   5 unit/hr





  .[id number] RASHIDA   5 mls/hr





    Titration





  Protocol  





  8 UNIT/HR  


 


Acetaminophen  100 mls @ 400 mls/hr  05/23/21 15:36  05/24/21 02:00





  Ofirmev  IV   Infused





  Q6HR PRN   Infusion





  PAIN  


 


Potassium Chloride/Dextrose/Sod Cl  1,000 mls @ 83.33 mls/hr  05/24/21 07:44  

05/24/21 08:26





  D5ns W/20 Meq Kcl  IV   83.33 mls/hr





  .Q12H1M RASHIDA   Administration


 


Loratadine  10 mg  05/24/21 09:00  05/24/21 08:12





  Loratadine 10 Mg Tablet  PO   10 mg





  DAILY RASHIDA   Administration


 


Metoclopramide HCl  10 mg  05/24/21 07:00  05/24/21 12:24





  Metoclopramide 10 Mg Tablet  PO   10 mg





  ACHS RASHIDA   Administration


 


Ondansetron HCl  4 mg  05/23/21 11:03  05/24/21 09:57





  Ondansetron 4 Mg/2 Ml Vial  IVP   4 mg





  Q6HR PRN   Administration





  Nausea / Vomiting  


 


Polyethylene Glycol  17 gm  05/24/21 09:00  05/24/21 08:34





  Polyethylene Glycol 3350 17 Gm Packet  PO   17 gm





  DAILY RASHIDA   Administration


 


Prochlorperazine Edisylate  10 mg  05/23/21 11:03  05/24/21 14:13





  Prochlorperazine 10 Mg/2 Ml Vial  IVP   10 mg





  Q6HR PRN   Administration





  Nausea / Vomiting  


 


Sodium Chloride  10 ml  05/23/21 17:00  05/24/21 08:12





  Sodium Chloride Flush 0.9% 10 Ml Syringe  IVP   10 ml





  0100,0900,1700 RASHIDA   Administration














- Lab Result


Fish Bone Diagrams: 


                                 05/24/21 05:49





                                 05/24/21 10:28





- Additional Planning


My Orders: 





My Active Orders





05/23/21 15:36


Home CPAP/BiPAP [RC] .ONCE 


Acetaminophen 1,000 mg/100 ml [Ofirmev] 100 ml IV Q6HR 





05/23/21 17:00


Sodium Chloride Flush 0.9% [Normal Saline Flush 0.9%]   10 ml IVP 0100,0900,1700







05/23/21 21:00


Famotidine [Pepcid]   20 mg IVP BID 





05/23/21 22:00


Sodium Chloride 0.9% 100Ml [Normal Saline 0.9% 100Ml] 99 ml   Insulin Regular 

Human [NovoLIN R] 100 unit IV 8 unit/hr 





05/24/21 Breakfast


Dysphagia Puree Diet [DIET] 





05/24/21 07:00


Metoclopramide [Reglan]   10 mg PO ACHS 





05/24/21 07:44


D5ns W/20 Meq KCl 1,000 ml IV 83.33 mls/hr 





05/24/21 09:00


polyethylene glycoL 3350 [Miralax]   17 gm PO DAILY 





05/24/21 09:26


HYDROmorphone 1MG CARP [Dilaudid 1Mg Carp]   1 mg IVP Q2HR PRN 





05/24/21 16:00


KETONES, SERUM (ACETEST) [CHEM] Timed 


PHOSPHORUS [CHEM] Timed 


POTASSIUM [CHEM] Timed 





05/25/21 05:00


BMP - BASIC METABOLIC PANEL [CHEM] DAILYLAB 


CALCIUM, IONIZED ([facility]) [BG] DAILYLAB 


CBC - COMP BLD CT W/AUTO DIFF [HEME] DAILYLAB 


MAGNESIUM [CHEM] DAILYLAB 


PHOSPHORUS [CHEM] DAILYLAB 





05/26/21 05:00


CALCIUM, IONIZED ([facility]) [BG] DAILYLAB 


CBC - COMP BLD CT W/AUTO DIFF [HEME] DAILYLAB 


MAGNESIUM [CHEM] DAILYLAB 


PHOSPHORUS [CHEM] DAILYLAB 





05/27/21 05:00


PHOSPHORUS [CHEM] DAILYLAB 














Subjective





- Subjective


Patient Reports: Feeling Better, Nausea (Reglan and Zofran helped and he could 

eat)





Objective


Vital Signs: 





                               Vital Signs - 24 hr











  05/23/21 05/23/21 05/23/21





  15:00 16:00 17:00


 


Temperature   37.3 C


 


Heart Rate [ 100 84 82





Monitoring   





electrodes]   


 


Respiratory 18 16 16





Rate   


 


Blood Pressure 134/85 H 128/79 126/77





[Right Brachial   





artery]   


 


O2 Saturation 99 98 98














  05/23/21 05/23/21 05/23/21





  19:19 20:00 21:00


 


Temperature 37.1 C  


 


Heart Rate [  83 79





Monitoring   





electrodes]   


 


Respiratory  21 20





Rate   


 


Blood Pressure  128/73 138/77 H





[Right Brachial   





artery]   


 


O2 Saturation  97 99














  05/23/21 05/23/21 05/23/21





  22:00 23:00 23:24


 


Temperature   37.2 C


 


Heart Rate [ 76 76 





Monitoring   





electrodes]   


 


Respiratory 19 19 





Rate   


 


Blood Pressure 132/79 H 132/79 H 





[Right Brachial   





artery]   


 


O2 Saturation 100 100 














  05/24/21 05/24/21 05/24/21





  00:00 01:00 02:05


 


Temperature   


 


Heart Rate [ 73 79 74





Monitoring   





electrodes]   


 


Respiratory 20 20 22





Rate   


 


Blood Pressure 123/77 129/74 121/70





[Right Brachial   





artery]   


 


O2 Saturation 98 99 98














  05/24/21 05/24/21 05/24/21





  03:00 04:00 05:00


 


Temperature 37.3 C  


 


Heart Rate [ 70 72 71





Monitoring   





electrodes]   


 


Respiratory 21 19 21





Rate   


 


Blood Pressure 129/78 120/70 117/75





[Right Brachial   





artery]   


 


O2 Saturation 99 97 98














  05/24/21 05/24/21 05/24/21





  06:00 07:00 08:00


 


Temperature   37.1 C


 


Heart Rate [ 80 73 72





Monitoring   





electrodes]   


 


Respiratory 19 21 13





Rate   


 


Blood Pressure 124/69 128/69 132/80 H





[Right Brachial   





artery]   


 


O2 Saturation 100 99 100














  05/24/21 05/24/21 05/24/21





  09:00 10:00 11:00


 


Temperature   


 


Heart Rate [ 91 73 68





Monitoring   





electrodes]   


 


Respiratory 20 11 L 20





Rate   


 


Blood Pressure 141/79 H 138/80 H 120/73





[Right Brachial   





artery]   


 


O2 Saturation 100 100 97














  05/24/21





  13:00


 


Temperature 37.2 C


 


Heart Rate [ 72





Monitoring 





electrodes] 


 


Respiratory 25 H





Rate 


 


Blood Pressure 151/86 H





[Right Brachial 





artery] 


 


O2 Saturation 98








                                     Oxygen











O2 Source                      Room air














I&O (Last 24 Hrs): 





                          Intake and Output Totals x24h











 05/22/21 05/23/21 05/24/21





 23:59 23:59 23:59


 


Intake Total  5053.767 3547.558


 


Output Total  2150 1050


 


Balance  2903.767 2497.558











General: Alert, Oriented x3


HEENT: Mucous membr. moist/pink


Neck: Supple, No JVD


Neuro: Alert, Non Focal


Cardiovascular: Regular rate, No murmurs


Respiratory: No respiratory distress


Abdomen: Soft, No tenderness


Extremities: No edema





- Results


Results: 





                               Laboratory Results











WBC  5.5 x10^3/uL (4.8-10.8)   05/24/21  05:49    


 


RBC  3.97 10^6/uL (4.70-6.10)  L  05/24/21  05:49    


 


Hgb  11.3 g/dL (14.0-18.0)  L  05/24/21  05:49    


 


Hct  32.9 % (42.0-52.0)  L  05/24/21  05:49    


 


MCV  82.9 fL (80.0-94.0)   05/24/21  05:49    


 


MCH  28.5 pg (27.0-31.0)   05/24/21  05:49    


 


MCHC  34.3 g/dL (32.0-36.0)   05/24/21  05:49    


 


RDW  12.9 % (12.0-15.0)   05/24/21  05:49    


 


Plt Count  210 10^3/uL (130-450)   05/24/21  05:49    


 


MPV  9.7 fL (7.4-11.4)   05/24/21  05:49    


 


Neut # (Auto)  3.8 10^3/uL (1.5-6.6)   05/24/21  05:49    


 


Lymph # (Auto)  1.0 10^3/uL (1.5-3.5)  L  05/24/21  05:49    


 


Mono # (Auto)  0.6 10^3/uL (0.0-1.0)   05/24/21  05:49    


 


Eos # (Auto)  0.1 10^3/uL (0.0-0.7)   05/24/21  05:49    


 


Baso # (Auto)  0.1 10^3/uL (0.0-0.1)   05/24/21  05:49    


 


Absolute Nucleated RBC  0.00 x10^3/uL  05/24/21  05:49    


 


Nucleated RBC %  0.0 /100WBC  05/24/21  05:49    


 


D-Dimer  797.0 ng/mL (200.0-255.0)  H  05/23/21  10:05    


 


VBG pH  7.262  (7.31-7.41)  L  05/24/21  07:58    


 


VBG pCO2  23.1 mmHg (41-51)  L  05/23/21  16:01    


 


VBG pO2  65.7 mmHg (25-47)  H  05/23/21  16:01    


 


VBG HCO3  8.9 mmol/L (23-28)  L  05/23/21  16:01    


 


VBG Total CO2  9.6 mmol/L (24-29)  L  05/23/21  16:01    


 


VBG O2 Saturation  92.7 % (60-80)  H  05/23/21  16:01    


 


VBG Base Excess  -17.2 mmol/L (-2 - +2)  L  05/23/21  16:01    


 


Ionized Calcium  1.27 mmol/L (1.15-1.33)   05/24/21  07:58    


 


Sodium  135 mmol/L (135-145)   05/24/21  05:49    


 


Potassium  3.4 mmol/L (3.5-5.0)  L  05/24/21  10:28    


 


Chloride  114 mmol/L (101-111)  H  05/24/21  05:49    


 


Carbon Dioxide  15 mmol/L (21-32)  L  05/24/21  05:49    


 


Anion Gap  6.0  (6-13)   05/24/21  05:49    


 


BUN  23 mg/dL (6-20)  H  05/24/21  05:49    


 


Creatinine  0.8 mg/dL (0.6-1.2)   05/24/21  05:49    


 


Estimated GFR (MDRD)  100  (>89)   05/24/21  05:49    


 


Glucose  134 mg/dL ()  H  05/24/21  05:49    


 


POC Whole Bld Glucose  154 mg/dL (70 - 100)  H  05/24/21  14:08    


 


Estimat Average Glucose  157 mg/dL ()  H  05/24/21  05:49    


 


Hemoglobin A1c %  7.1 % (4.27-6.07)  H  05/24/21  05:49    


 


Lactic Acid  1.7 mmol/L (0.5-2.2)   05/23/21  10:45    


 


Calcium  8.5 mg/dL (8.5-10.3)   05/24/21  05:49    


 


Phosphorus  1.2 mg/dL (2.5-4.6)  L  05/24/21  05:49    


 


Magnesium  2.3 mg/dL (1.7-2.8)   05/24/21  12:34    


 


Total Bilirubin  2.0 mg/dL (0.2-1.0)  H  05/23/21  10:05    


 


AST  13 IU/L (10-42)   05/23/21  10:05    


 


ALT  19 IU/L (10-60)   05/23/21  10:05    


 


Alkaline Phosphatase  86 IU/L ()   05/23/21  10:05    


 


Troponin I High Sens  8.4 ng/L (2.3-19.7)   05/23/21  10:05    


 


B-Natriuretic Peptide  41 pg/mL (5-100)   05/23/21  10:05    


 


Total Protein  8.4 g/dL (6.7-8.2)  H  05/23/21  10:05    


 


Albumin  4.1 g/dL (3.2-5.5)   05/23/21  10:05    


 


Globulin  4.3 g/dL (2.1-4.2)  H  05/23/21  10:05    


 


Albumin/Globulin Ratio  1.0  (1.0-2.2)   05/23/21  10:05    


 


Triglycerides  163 mg/dL (-149) H  05/24/21  05:49    


 


Amylase  71 U/L ()   05/24/21  05:49    


 


Lipase  55 U/L (22-51)  H  05/24/21  05:49    


 


Urine Color  YELLOW   05/23/21  15:19    


 


Urine Clarity  HAZY  (CLEAR)   05/23/21  15:19    


 


Urine pH  5.0 PH (5.0-7.5)   05/23/21  15:19    


 


Ur Specific Gravity  >=1.030  (1.002-1.030)  H  05/23/21  15:19    


 


Urine Protein  TRACE mg/dL (NEGATIVE)   05/23/21  15:19    


 


Urine Glucose (UA)  500 mg/dL (NEGATIVE)  H  05/23/21  15:19    


 


Urine Ketones  >=80 mg/dL (NEGATIVE)  H  05/23/21  15:19    


 


Urine Occult Blood  MODERATE  (NEGATIVE)  H  05/23/21  15:19    


 


Urine Nitrite  NEGATIVE  (NEGATIVE)   05/23/21  15:19    


 


Urine Bilirubin  NEGATIVE  (NEGATIVE)   05/23/21  15:19    


 


Urine Urobilinogen  0.2 (NORMAL) E.U./dL (NORMAL)   05/23/21  15:19    


 


Ur Leukocyte Esterase  NEGATIVE  (NEGATIVE)   05/23/21  15:19    


 


Urine RBC  0-5 /HPF (0-5)   05/23/21  15:19    


 


Urine WBC  0-3 /HPF (0-3)   05/23/21  15:19    


 


Ur Squamous Epith Cells  FEW Squamous  (<= Few)   05/23/21  15:19    


 


Urine Bacteria  Few /HPF (None Seen)   05/23/21  15:19    


 


Urine Casts  6-10 Fine Granular /LPF  05/23/21  15:19    


 


Urine Culture Comments  NOT INDICATED   05/23/21  15:19    


 


Nasal Adenovirus (PCR)  NOT DETECTED   05/23/21  10:45    


 


Nasal B. parapertussis DNA (PCR)  NOT DETECTED   05/23/21  10:45    


 


Nasal Coronavir 229E PCR  NOT DETECTED   05/23/21  10:45    


 


Nasal Coronavir HKU1 PCR  NOT DETECTED   05/23/21  10:45    


 


Nasal Coronavir NL63 PCR  NOT DETECTED   05/23/21  10:45    


 


Nasal Coronavir OC43 PCR  NOT DETECTED   05/23/21  10:45    


 


Nasal Enterovir/Rhinovir PCR  NOT DETECTED   05/23/21  10:45    


 


Nasal Influenza B PCR  NOT DETECTED   05/23/21  10:45    


 


Nasal Influenza A PCR  NOT DETECTED   05/23/21  10:45    


 


Nasal Parainfluen 1 PCR  NOT DETECTED   05/23/21  10:45    


 


Nasal Parainfluen 2 PCR  NOT DETECTED   05/23/21  10:45    


 


Nasal Parainfluen 3 PCR  NOT DETECTED   05/23/21  10:45    


 


Nasal Parainfluen 4 PCR  NOT DETECTED   05/23/21  10:45    


 


Nasal RSV (PCR)  NOT DETECTED   05/23/21  10:45    


 


Nasal Screen MRSA (PCR)  NEGATIVE  (NEGATIVE)   05/23/21  12:15    


 


Nasal B.pertussis DNA PCR  NOT DETECTED   05/23/21  10:45    


 


Nasal C.pneumoniae (PCR)  NOT DETECTED   05/23/21  10:45    


 


[place] Human Metapneumo PCR  NOT DETECTED   05/23/21  10:45    


 


Nasal M.pneumoniae (PCR)  NOT DETECTED   05/23/21  10:45    


 


Nasal SARS-CoV-2 (PCR)  NOT DETECTED   05/23/21  10:45    


 


Urine Opiates Screen  POSITIVE  (NEGATIVE)  H  05/23/21  15:19    


 


Ur Oxycodone Screen  NEGATIVE  (NEGATIVE)   05/23/21  15:19    


 


Urine Methadone Screen  NEGATIVE  (NEGATIVE)   05/23/21  15:19    


 


Ur Propoxyphene Screen  NEGATIVE  (NEGATIVE)   05/23/21  15:19    


 


Ur Barbiturates Screen  NEGATIVE  (NEGATIVE)   05/23/21  15:19    


 


Ur Tricyclics Screen  NEGATIVE  (NEGATIVE)   05/23/21  15:19    


 


Ur Phencyclidine Scrn  NEGATIVE  (NEGATIVE)   05/23/21  15:19    


 


Ur Amphetamine Screen  NEGATIVE  (NEGATIVE)   05/23/21  15:19    


 


U Methamphetamines Scrn  NEGATIVE  (NEGATIVE)   05/23/21  15:19    


 


U Benzodiazepines Scrn  NEGATIVE  (NEGATIVE)   05/23/21  15:19    


 


Urine Cocaine Screen  NEGATIVE  (NEGATIVE)   05/23/21  15:19    


 


U Cannabinoids Screen  NEGATIVE  (NEGATIVE)   05/23/21  15:19    


 


Serum Ketones  SMALL  (NEGATIVE)  H  05/24/21  12:34

## 2021-05-25 VITALS — DIASTOLIC BLOOD PRESSURE: 83 MMHG | SYSTOLIC BLOOD PRESSURE: 137 MMHG

## 2021-05-25 LAB
ANION GAP SERPL CALCULATED.4IONS-SCNC: 5 MMOL/L (ref 6–13)
BASOPHILS NFR BLD AUTO: 0 10^3/UL (ref 0–0.1)
BASOPHILS NFR BLD AUTO: 0.9 %
BUN SERPL-MCNC: 14 MG/DL (ref 6–20)
CA-I SERPL ISE-MCNC: 1.2 MMOL/L (ref 1.15–1.33)
CALCIUM UR-MCNC: 8.4 MG/DL (ref 8.5–10.3)
CHLORIDE SERPL-SCNC: 113 MMOL/L (ref 101–111)
CO2 SERPL-SCNC: 19 MMOL/L (ref 21–32)
CREAT SERPLBLD-SCNC: 0.7 MG/DL (ref 0.6–1.2)
EOSINOPHIL # BLD AUTO: 0.2 10^3/UL (ref 0–0.7)
EOSINOPHIL NFR BLD AUTO: 3.7 %
ERYTHROCYTE [DISTWIDTH] IN BLOOD BY AUTOMATED COUNT: 13 % (ref 12–15)
GFRSERPLBLD MDRD-ARVRAT: 117 ML/MIN/{1.73_M2} (ref 89–?)
GLUCOSE SERPL-MCNC: 172 MG/DL (ref 70–100)
HCT VFR BLD AUTO: 32.6 % (ref 42–52)
HGB UR QL STRIP: 11.1 G/DL (ref 14–18)
LYMPHOCYTES # SPEC AUTO: 1.1 10^3/UL (ref 1.5–3.5)
LYMPHOCYTES NFR BLD AUTO: 25.1 %
MAGNESIUM SERPL-MCNC: 2.2 MG/DL (ref 1.7–2.8)
MCH RBC QN AUTO: 28.2 PG (ref 27–31)
MCHC RBC AUTO-ENTMCNC: 34 G/DL (ref 32–36)
MCV RBC AUTO: 82.7 FL (ref 80–94)
MONOCYTES # BLD AUTO: 0.6 10^3/UL (ref 0–1)
MONOCYTES NFR BLD AUTO: 12.6 %
NEUTROPHILS # BLD AUTO: 2.5 10^3/UL (ref 1.5–6.6)
NEUTROPHILS # SNV AUTO: 4.4 X10^3/UL (ref 4.8–10.8)
NEUTROPHILS NFR BLD AUTO: 57.5 %
NRBC # BLD AUTO: 0 /100WBC
NRBC # BLD AUTO: 0 X10^3/UL
PDW BLD AUTO: 10 FL (ref 7.4–11.4)
PH BLDV: 7.42 [PH] (ref 7.31–7.41)
PHOSPHATE BLD-MCNC: 2.8 MG/DL (ref 2.5–4.6)
PLATELET # BLD: 189 10^3/UL (ref 130–450)
POTASSIUM SERPL-SCNC: 3.5 MMOL/L (ref 3.5–5)
RBC MAR: 3.94 10^6/UL (ref 4.7–6.1)
SODIUM SERPLBLD-SCNC: 137 MMOL/L (ref 135–145)

## 2021-05-25 RX ADMIN — SODIUM CHLORIDE, PRESERVATIVE FREE PRN ML: 5 INJECTION INTRAVENOUS at 08:56

## 2021-05-25 RX ADMIN — INSULIN ASPART SCH UNIT: 100 INJECTION, SOLUTION INTRAVENOUS; SUBCUTANEOUS at 08:01

## 2021-05-25 RX ADMIN — HYDROMORPHONE HYDROCHLORIDE PRN MG: 1 INJECTION, SOLUTION INTRAMUSCULAR; INTRAVENOUS; SUBCUTANEOUS at 03:12

## 2021-05-25 RX ADMIN — SODIUM CHLORIDE, PRESERVATIVE FREE SCH ML: 5 INJECTION INTRAVENOUS at 08:29

## 2021-05-25 RX ADMIN — HYDROMORPHONE HYDROCHLORIDE PRN MG: 1 INJECTION, SOLUTION INTRAMUSCULAR; INTRAVENOUS; SUBCUTANEOUS at 08:56

## 2021-05-25 RX ADMIN — INSULIN ASPART SCH UNIT: 100 INJECTION, SOLUTION INTRAVENOUS; SUBCUTANEOUS at 11:54

## 2021-05-25 RX ADMIN — METOCLOPRAMIDE SCH MG: 10 TABLET ORAL at 11:54

## 2021-05-25 RX ADMIN — METOCLOPRAMIDE SCH MG: 10 TABLET ORAL at 06:40

## 2021-05-25 RX ADMIN — LORATADINE SCH MG: 10 TABLET ORAL at 08:26

## 2021-05-25 NOTE — DISCHARGE PLAN
Discharge Plan


Problem Reviewed?: Yes


Disposition: 01 Home, Self Care


Condition: Good


Prescriptions: 


Insulin Aspart [NovoLOG] 5 unit SUBQ 0800,1200,1700,2100 #2 amp


Diet: Diabetic


Activity Restrictions: Activity as Tolerated


Shower Restrictions: No


Driving Restrictions: No


Instruction Topics:  Insulin Types, Diabetes Sick Day Plan, Diabetic Ket

oacidosis


Health Concerns: 


You presented to our emergency room 2 days after having a kidney biopsy at 

[facility].  The complaint was that of nonstop vomiting, 

nausea, and you were starting to get confused.  Your wife brought you to the 

emergency room and we found you to have diabetic ketoacidosis.  You were placed 

in the ICU and received an insulin drip, aggressive intravenous hydration, and 

your sugars have come back down to acceptable range and your confusion is 

resolved.  The only lingering problem you are having right now is weakness from 

how dehydrated you were, and kidney pain at the biopsy site.


Plan of Treatment: 


1.  You will resume your usual home pain medications that you were sent home 

from from [facility].  That is Percocet 5/325.





2.  We will are asking you to stop Metformin and Jardiance until you see your 

primary care provider.  Jardiance can make you very dehydrated.  And Metformin, 

when you are dehydrated, can contribute to severe metabolic acidosis.





3.  Please see your primary care provider in follow-up to go over your 

medications as well as to hopefully get your kidney biopsy results





4.  We are adding short acting insulin.  We are asking you to take 5 units 

before each meal.  And you will continue your Lantus.


Care Goals: 


To find out the results of the kidney biopsy, and to get glucose under control


Assessment: 


And wife understand goals, plans, and will follow through


No Smoking: If you smoke, Please STOP!  Call [phone] for help.


Follow-up with: 


Ibis Aranda MD [Primary Care Provider] -

## 2021-05-25 NOTE — DISCHARGE SUMMARY
Discharge Summary


Admit Date: 05/23/21


Discharge Date: 05/25/21


Discharging Provider: Carmen Perrin MD


Primary Care Provider: Ibis Burleson MD ([facility])


Code Status: Attempt Resuscitation


Condition at Discharge: Good


Discharge Disposition: 01 Home, Self Care





- DIAGNOSES


Discharge Diagnoses with Status of Each Condition: 


1.  DKA


2.  Chronic abdominal pain with acute exacerbation


3.  History of partial nephrectomy


4.  Renal neoplasm of unknown behavior


5.  Medical marijuana use


6.  Sleep apnea with continuous positive airway pressure








- HPI


History of Present Illness: 





This is a 55-year-old WM with history of DM type 2 on insulin and oral agents, 

with recently diagnosed renal mass who underwent partial nephrectomy at [facility] about 5 days ago.  He was discharged 2 days ago.  He has only 

been drinking liquids over these 2 days, having abdominal pain and also nausea 

and vomiting, not eating any solids.  He became more confused today and the wife

brought him to the emergency room.  By work-up in the ER, he has been diagnosed 

with DKA with serum pH of 7.07, elevated anion gap of 23, large ketones and a 

serum glucose level of 353.  He has never had DKA before. His CXR shows 

pneumoperitoneum, likely related to his recent surgery.  He is being admitted to

the ICU for DKA protocol.





- Past Medical History


Cardiovascular: reports: High cholesterol


Respiratory: reports: Sleep apnea, CPAP use


Neuro: reports: None


Endocrine/Autoimmune: reports: Type 2 diabetes


GI: reports: Diverticulitis, Other (GERD vs gastroparesis)


: reports: None, Other


HEENT: reports: Chronic sinusitis


Psych: reports: None


Derm: reports: None


MRSA Hx?: No


Other Past Medical History: kidney cancer with partial nephrectomy 5/18/21





- Past Surgical History


General: reports: Other (Partial nephrectomy)


Ortho: reports: Shoulder arthroplasty


Derm: reports: Debridement





- CONSULTS | PROCEDURES


Procedures: 


1.  Chest x-ray with pneumoperitoneum.  With a history of recent partial 

nephrectomy, this would explain the gas.  Low lung volumes.  No acute lung 

abnormality.





2.  Blood culture negative after 2 days








- HOSPITAL COURSE


Hospital Course: 





After being in the intensive care unit with an insulin drip, his sugars 

responded, electrolytes were replaced, and his confusion resolved.  Ketones 

became negative.  His main problem is that of mild generalized weakness.  But he

is able to get out of bed and ambulate.  Pain is being controlled by IV Dilaudid

and he is requesting that his pain management that he was on before admission be

resumed.  That is Percocet.





I am asking him to please stop Metformin and Jardiance in the face of someone 

who has been recently dehydrated with decreased p.o. intake.  I explained to him

that I want to avoid further dehydration with the Jardiance and lactic acidosis 

with the Metformin.





I would like him to see his primary care provider in follow-up.  I am adding 

short acting insulin before meals and at bedtime and continuing his Lantus.  A1c

during his stay was 7.1%.





At discharge he is alert, oriented, pain is controlled.  Wife/significant other 

is at the bedside.  Temperature is 37.2.  Pulse is 70.  Blood pressure 136/80.  

Respirations 18 and he is 96% on room air.  He is 5 foot 10 inches tall and 

weighs 80 kg.  He is an alert, pleasant, lucid white male who looks his stated 

age.  No acute distress.  Neck is supple.  Lungs are clear.  No increased 

respiratory effort.  Abdomen is soft, nontender, normal bowel sounds.  Slightly 

distended because his last bowel movement was on the 18th.  So he has been 

relatively constipated ever since he has had his nephrectomy.  He feels the sto

ol in his vault but it just will not come out.  Extremities are without edema.  

He ambulates without assistance.  Right flank scar is pink, normal scar 

formation, no surrounding cellulitis.





- ALLERGIES


Allergies/Adverse Reactions: 


                                    Allergies











Allergy/AdvReac Type Severity Reaction Status Date / Time


 


lisinopril Allergy  Hives Verified 05/23/21 09:54














- MEDICATIONS


Home Medications: 


                                Ambulatory Orders











 Medication  Instructions  Recorded  Confirmed


 


Aspirin [Aspir 81] 81 mg PO DAILY 01/05/14 05/23/21


 


Atorvastatin Calcium [Lipitor] 10 mg PO HS 01/05/14 05/23/21


 


Cetirizine HCl [Zyrtec] 10 mg PO DAILY 01/05/14 05/23/21


 


Losartan Potassium 50 mg PO DAILY 11/06/17 05/23/21


 


Cholecalciferol [Vitamin D3] 25 mcg PO DAILY 05/23/21 05/23/21


 


Insulin Glargine [Lantus Solostar] 55 unit SUBQ QPM 05/23/21 05/24/21


 


Multivitamin [Theragran] 1 tab ORAL DAILY 05/23/21 05/23/21


 


Ondansetron Odt [Zofran Odt] 4 mg TL Q6H PRN 05/23/21 05/23/21


 


Senna [Senokot] 8.6 mg PO BID 05/23/21 05/23/21


 


oxyCODONE/ACET 5/325 [Percocet 5 1 - 2 each PO Q4-6H 05/23/21 05/23/21





mg/325 mg]   


 


Docusate Sodium 250Mg Capsule 250 - 500 mg PO DAILY 05/25/21 





[Colace 250Mg Capsule]   


 


Insulin Aspart [NovoLOG] 5 unit SUBQ 0800,1200,1700,2100 #2 05/25/21 





 amp  














- LABS


Result Diagrams: 


                                 05/25/21 04:39





                                 05/25/21 04:39

## 2021-06-09 ENCOUNTER — HOSPITAL ENCOUNTER (EMERGENCY)
Dept: HOSPITAL 76 - ED | Age: 56
Discharge: HOME | End: 2021-06-09
Payer: COMMERCIAL

## 2021-06-09 VITALS — SYSTOLIC BLOOD PRESSURE: 103 MMHG | DIASTOLIC BLOOD PRESSURE: 88 MMHG

## 2021-06-09 DIAGNOSIS — Z79.82: ICD-10-CM

## 2021-06-09 DIAGNOSIS — Z85.528: ICD-10-CM

## 2021-06-09 DIAGNOSIS — Z87.891: ICD-10-CM

## 2021-06-09 DIAGNOSIS — K40.20: ICD-10-CM

## 2021-06-09 DIAGNOSIS — Z79.4: ICD-10-CM

## 2021-06-09 DIAGNOSIS — Z90.5: ICD-10-CM

## 2021-06-09 DIAGNOSIS — E11.65: ICD-10-CM

## 2021-06-09 DIAGNOSIS — K43.9: Primary | ICD-10-CM

## 2021-06-09 LAB
ALBUMIN DIAFP-MCNC: 4 G/DL (ref 3.2–5.5)
ALBUMIN/GLOB SERPL: 1.3 {RATIO} (ref 1–2.2)
ALP SERPL-CCNC: 80 IU/L (ref 42–121)
ALT SERPL W P-5'-P-CCNC: 43 IU/L (ref 10–60)
ANION GAP SERPL CALCULATED.4IONS-SCNC: 11 MMOL/L (ref 6–13)
AST SERPL W P-5'-P-CCNC: 25 IU/L (ref 10–42)
BASOPHILS NFR BLD AUTO: 0.1 10^3/UL (ref 0–0.1)
BASOPHILS NFR BLD AUTO: 1.1 %
BILIRUB BLD-MCNC: 0.8 MG/DL (ref 0.2–1)
BUN SERPL-MCNC: 11 MG/DL (ref 6–20)
CALCIUM UR-MCNC: 9.7 MG/DL (ref 8.5–10.3)
CHLORIDE SERPL-SCNC: 103 MMOL/L (ref 101–111)
CO2 SERPL-SCNC: 23 MMOL/L (ref 21–32)
CREAT SERPLBLD-SCNC: 0.8 MG/DL (ref 0.6–1.2)
EOSINOPHIL # BLD AUTO: 0.3 10^3/UL (ref 0–0.7)
EOSINOPHIL NFR BLD AUTO: 3.7 %
ERYTHROCYTE [DISTWIDTH] IN BLOOD BY AUTOMATED COUNT: 13.6 % (ref 12–15)
GFRSERPLBLD MDRD-ARVRAT: 100 ML/MIN/{1.73_M2} (ref 89–?)
GLOBULIN SER-MCNC: 3 G/DL (ref 2.1–4.2)
GLUCOSE SERPL-MCNC: 320 MG/DL (ref 70–100)
HCT VFR BLD AUTO: 37 % (ref 42–52)
HGB UR QL STRIP: 12 G/DL (ref 14–18)
LYMPHOCYTES # SPEC AUTO: 1.2 10^3/UL (ref 1.5–3.5)
LYMPHOCYTES NFR BLD AUTO: 17 %
MCH RBC QN AUTO: 28 PG (ref 27–31)
MCHC RBC AUTO-ENTMCNC: 32.4 G/DL (ref 32–36)
MCV RBC AUTO: 86.4 FL (ref 80–94)
MONOCYTES # BLD AUTO: 0.4 10^3/UL (ref 0–1)
MONOCYTES NFR BLD AUTO: 6 %
NEUTROPHILS # BLD AUTO: 5.1 10^3/UL (ref 1.5–6.6)
NEUTROPHILS # SNV AUTO: 7.1 X10^3/UL (ref 4.8–10.8)
NEUTROPHILS NFR BLD AUTO: 71.9 %
NRBC # BLD AUTO: 0 /100WBC
NRBC # BLD AUTO: 0 X10^3/UL
PDW BLD AUTO: 10.2 FL (ref 7.4–11.4)
PLATELET # BLD: 302 10^3/UL (ref 130–450)
POTASSIUM SERPL-SCNC: 4 MMOL/L (ref 3.5–5)
PROT SPEC-MCNC: 7 G/DL (ref 6.7–8.2)
RBC MAR: 4.28 10^6/UL (ref 4.7–6.1)
SODIUM SERPLBLD-SCNC: 137 MMOL/L (ref 135–145)

## 2021-06-09 PROCEDURE — 36415 COLL VENOUS BLD VENIPUNCTURE: CPT

## 2021-06-09 PROCEDURE — 99284 EMERGENCY DEPT VISIT MOD MDM: CPT

## 2021-06-09 PROCEDURE — 80053 COMPREHEN METABOLIC PANEL: CPT

## 2021-06-09 PROCEDURE — 74177 CT ABD & PELVIS W/CONTRAST: CPT

## 2021-06-09 PROCEDURE — 85025 COMPLETE CBC W/AUTO DIFF WBC: CPT

## 2021-06-09 NOTE — EXTERNAL MEDICAL SUMMARY RPT
Continuity of Care Document

                             Created on:2021



Patient:MICHAEL HAWK

Sex:Male

:1965

External Reference #:[id number]





Demographics







                          Phone                     Unavailable

 

                          Preferred Language        Unknown

 

                          Marital Status            Unknown

 

                          Buddhist Affiliation     Unknown

 

                          Race                      Unknown

 

                          Ethnic Group              Unknown









Author







                          Organization              [facility]

 

                          Address                    [place] [address]

 

                          Phone                     [phone]









Allergies





Encounters





Medications





Problems







                     date                description         facility

 

                     2021            Other specified disorders of kidney and

  [facility] 

[employer]



                                        ureter              







Results

## 2021-06-09 NOTE — CT REPORT
PROCEDURE:  Abdomen/Pelvis W

 

INDICATIONS:  IV only, R abd pain

 

TECHNIQUE:  

After the administration of IV contrast, 5 mm thick sections acquired from the diaphragms to the symp
hysis.  5 mm thick coronal and sagittal reformats were acquired.  For radiation dose reduction, the f
ollowing was used:  automated exposure control, adjustment of mA and/or kV according to patient size.
  

 

COMPARISON:  CT abdomen pelvis 11/6/2017

 

FINDINGS:  

Image quality:  Excellent.  

 

ABDOMEN:  

Lung bases:  Lung bases are clear.  Heart size is normal.  

 

Solid organs:  Liver is mildly prominent with steatosis. Right hepatic lobe cysts are again noted. Sp
naseem is normal in ron and enhancement.  Gallbladder is unremarkable  Biliary system is non dilated.  
Pancreas enhances normally.  No adrenal nodules. Left kidney and ureter are nonobstructed. Right jim
l cyst is present measuring 7 mm.. Right kidney demonstrates prominent perinephric stranding. There i
s ill-defined soft tissue density with foci of air identified along the superior medial aspect of the
 right kidney measuring approximately 3.0 x 1.9 cm. There is decreased enhancement within the medial 
right renal cortex adjacent to the soft tissue density with air. 2 mm nonobstructing superior pole re
nal calculus is noted. This calcification is adjacent to a calyx which is in direct approximation to 
the medial perirenal fluid/air collection. Inflammatory change extends to the descending colon with a
reas of thickening.

 

Peritoneum and bowel:  Bowel loops demonstrate thickening within the ascending colon adjacent periren
al stranding. Appendix is normal.

 

Nodes and vessels:  No retroperitoneal or mesenteric adenopathy by size criteria.  Aorta and inferior
 vena cava are normal in size.  

 

Miscellaneous: Small fat-containing ventral hernia..  

 

 

PELVIS:  

Genitourinary:  Bladder wall thickness is normal.  

 

Miscellaneous: Fat-containing inguinal hernias are present.

 

Bones:  No suspicious bony lesions.  No vertebral body compression fractures.  

 

IMPRESSION:  

 

 

1. Prominent right perirenal stranding with perirenal focus of soft tissue density and air most sugge
stive of abscess. This may be secondary to a potentially obstructive stone causing infection and deco
mpression into the perirenal space.

 

 

2. Mild appearance of thickening and inflammatory change within the ascending colon adjacent to the p
erirenal stranding suspected to be secondary inflammation.

 

Reviewed by: Cheyanne Ortiz MD on 6/9/2021 11:44 AM PDT

Approved by: Cheyanne Ortiz MD on 6/9/2021 11:44 AM PDT

 

 

Station ID:  [id number]

## 2021-06-09 NOTE — ED PHYSICIAN DOCUMENTATION
PD HPI ABD PAIN





- Stated complaint


Stated Complaint: PX POST SURGERY





- Chief complaint


Chief Complaint: Abd Pain





- History obtained from


History obtained from: Patient





- Additional information


Additional information: 





55-year-old gentleman with history of type 2 diabetes had a partial right 

nephrectomy about 3 weeks ago by Dr. Preciado at [facility].  Postoperatively he 

developed DKA and his medications were adjusted.  Now blood sugars generally 

running under 200.  Over the last few days has developed right groin pain 

followed by fullness of the right abdomen.  There is no associated nausea.  No 

trouble with the bowel movements.  No fevers.  Other than the nephrectomy on the

right, no other abdominal surgeries.  He notes that the pathology was positive 

for renal cell carcinoma but with negative margins and as such adjuvant therapy 

is not necessary.





Review of Systems


Ten Systems: 10 systems reviewed and negative


Constitutional: denies: Fever, Chills


GI: reports: Abdominal Pain.  denies: Nausea, Vomiting, Constipation, Diarrhea





PD PAST MEDICAL HISTORY





- Past Medical History


Cardiovascular: High cholesterol


Respiratory: Sleep apnea, CPAP use


Neuro: None


Endocrine/Autoimmune: Type 2 diabetes


GI: Diverticulitis, Other (GERD vs gastroparesis)


: None, Other


HEENT: Chronic sinusitis


Psych: None


Musculoskeletal: Other


Derm: None





- Past Surgical History


Past Surgical History: Yes


General: Other (Partial nephrectomy)


Ortho: Shoulder arthroplasty


Derm: Debridement





- Present Medications


Home Medications: 


                                Ambulatory Orders











 Medication  Instructions  Recorded  Confirmed


 


Aspirin [Aspir 81] 81 mg PO DAILY 01/05/14 06/09/21


 


Atorvastatin Calcium [Lipitor] 10 mg PO HS 01/05/14 06/09/21


 


Cetirizine HCl [Zyrtec] 10 mg PO DAILY 01/05/14 06/09/21


 


Losartan Potassium 50 mg PO DAILY 11/06/17 06/09/21


 


Cholecalciferol [Vitamin D3] 25 mcg PO DAILY 05/23/21 06/09/21


 


Insulin Glargine [Lantus Solostar] 55 unit SUBQ DAILY 05/23/21 06/09/21


 


Multivitamin [Theragran] 1 tab ORAL DAILY 05/23/21 06/09/21


 


Ondansetron Odt [Zofran Odt] 4 mg TL Q6H PRN 05/23/21 06/09/21


 


Senna [Senokot] 8.6 mg PO DAILY 05/23/21 06/09/21


 


oxyCODONE/ACET 5/325 [Percocet 5 1 - 2 each PO Q4-6H PRN 05/23/21 06/09/21





mg/325 mg]   


 


Hydromorphone HCl 4 mg PO DAILY PRN 06/09/21 06/09/21


 


Insulin Aspart [NovoLOG] 5 unit SUBQ DAILY PM 06/09/21 06/09/21


 


Pioglitazone [Actos] 15 mg PO DAILY 06/09/21 06/09/21


 


polyethylene glycoL 3350 [Miralax] 17 gm PO DAILY PRN 06/09/21 06/09/21














- Allergies


Allergies/Adverse Reactions: 


                                    Allergies











Allergy/AdvReac Type Severity Reaction Status Date / Time


 


lisinopril Allergy  Hives Verified 06/09/21 10:06


 


semaglutide [From Ozempic] AdvReac  Nausea Verified 06/09/21 10:06














- Social History


Does the pt smoke?: No


Smoking Status: Former smoker


Does the pt drink ETOH?: No


Does the pt have substance abuse?: Yes





- Immunizations


Immunizations are current?: Yes





PD ED PE NORMAL





- Vitals


Vital signs reviewed: Yes





- General


General: Alert and oriented X 3, No acute distress





- HEENT


HEENT: PERRL, EOMI





- Neck


Neck: Supple, no meningeal sign, No bony TTP





- Cardiac


Cardiac: RRR, No murmur





- Respiratory


Respiratory: No respiratory distress, Clear bilaterally





- Abdomen


Abdomen: Other (Right-sided abdomen is definitely prominent compared with the 

left.  He has a right flank surgical incision that is clean dry and intact 

without evidence of infection.  Also has fullness in the right inguinal area.  

Tender there as well.)





- Back


Back: No CVA TTP, No spinal TTP





- Derm


Derm: Normal color, Warm and dry





- Extremities


Extremities: No edema, No calf tenderness / cord





- Neuro


Neuro: Alert and oriented X 3, Normal speech





Results





- Vitals


Vitals: 


                               Vital Signs - 24 hr











  06/09/21 06/09/21 06/09/21





  09:41 10:11 11:17


 


Temperature 36.3 C L  


 


Heart Rate 84 71 76


 


Respiratory 16 16 16





Rate   


 


Blood Pressure 119/73 109/80 115/76


 


O2 Saturation 99 97 98








                                     Oxygen











O2 Source                      Room air

















- Labs


Labs: 


                                Laboratory Tests











  06/09/21 06/09/21





  10:03 10:03


 


WBC  7.1 


 


RBC  4.28 L 


 


Hgb  12.0 L 


 


Hct  37.0 L 


 


MCV  86.4 


 


MCH  28.0 


 


MCHC  32.4 


 


RDW  13.6 


 


Plt Count  302 


 


MPV  10.2 


 


Neut # (Auto)  5.1 


 


Lymph # (Auto)  1.2 L 


 


Mono # (Auto)  0.4 


 


Eos # (Auto)  0.3 


 


Baso # (Auto)  0.1 


 


Absolute Nucleated RBC  0.00 


 


Nucleated RBC %  0.0 


 


Sodium   137


 


Potassium   4.0


 


Chloride   103


 


Carbon Dioxide   23


 


Anion Gap   11.0


 


BUN   11


 


Creatinine   0.8


 


Estimated GFR (MDRD)   100


 


Glucose   320 H


 


Calcium   9.7


 


Total Bilirubin   0.8


 


AST   25


 


ALT   43


 


Alkaline Phosphatase   80


 


Total Protein   7.0


 


Albumin   4.0


 


Globulin   3.0


 


Albumin/Globulin Ratio   1.3














PD MEDICAL DECISION MAKING





- ED course


ED course: 





55-year-old gentleman presents with fullness in the right groin, on CT this 

corresponds to a fat-containing hernia.  The CT results were discussed with the 

radiologist reading.  She felt that the other findings around the right kidney 

could be postoperative but cannot rule out abscess.  Given the lack of 

increasing flank pain, fever, or elevated white count I sincerely doubt it.  I 

was trying to get a hold of the patient's urologist but he wanted to go home and

I think this is fine given the lack of the above findings.  Asked him to 

schedule appointment with her within the next few days for follow-up and given a

copy of the CAT scan on CD.  Also discussed that [place] regarding the hernias.





Departure





- Departure


Disposition: 01 Home, Self Care


Clinical Impression: 


Abdominal hernia


Qualifiers:


 Hernia type: ventral Obstruction and gangrene presence: without obstruction or 

gangrene Qualified Code(s): K43.9 - Ventral hernia without obstruction or 

gangrene





DM2 (diabetes mellitus, type 2)


Qualifiers:


 Diabetes mellitus long term insulin use: with long term use Diabetes mellitus 

complication status: with hyperglycemia Qualified Code(s): E11.65 - Type 2 

diabetes mellitus with hyperglycemia; Z79.4 - Long term (current) use of insulin





Condition: Good


Record reviewed to determine appropriate education?: Yes


Instructions:  ED Hernia Inguinal


Comments: 


As discussed, there is bilateral inguinal hernias worse on the right than left 

and also a ventral hernia.  I think it is the right inguinal hernia that is 

bothering you.  [place] general surgery referral for 

same.  Return if worsening.  Also the CT reading was initially concerning for 

abscess around the surgical site but given of lack of severe pain there, fever, 

or elevated white blood cell count I doubt this is the case.  Follow-up with Dr. Wilkinson, Friday or Monday.  Return for new or worsening symptoms.  Take the 

copy of the CAT scan on CD with you to the appointment with urologist.





IMPRESSION: 





1. Prominent right perirenal stranding with perirenal focus of soft tissue 

density and air most 


suggestive of abscess. This may be secondary to a potentially obstructive stone 

causing infection 


and decompression into the perirenal space. 





2. Mild appearance of thickening and inflammatory change within the ascending 

colon adjacent to the


perirenal stranding suspected to be secondary inflammation. 





Reviewed by: Cheyanne Ortiz MD on 6/9/2021 11:44 AM PDT 


Approved by: Cheyanne Ortiz MD on 6/9/2021 11:44 AM PDT 





******** ADDENDUM #1 ******** 





The case was reviewed with Dr. Santos Jurado on 6/9/2021 at 12:00 PM. 





Clinical history given was recent partial right nephrectomy. This could account 

for appearance of 


inflammatory change. In addition, there is soft tissue density and air are 

identified can be 


postsurgical related to postoperative fluid. Given appearance, developing 

phlegmon/abscess cannot be


excluded and recommend clinical correlation and follow-up imaging if concern 

persists. 





Reviewed by: Cheyanne Ortiz MD on 6/9/2021 12:02 PM PDT 


Approved by: Chyeanne Ortiz MD on 6/9/2021 12:02 PM PDT

## 2021-06-09 NOTE — EXTERNAL MEDICAL SUMMARY RPT
Continuity of Care Document

                             Created on:2021



Patient:MICHAEL HAWK

Sex:Male

:1965

External Reference #:[id number]





Demographics







                          Phone                     Unavailable

 

                          Preferred Language        Unknown

 

                          Marital Status            Unknown

 

                          Mu-ism Affiliation     Unknown

 

                          Race                      Unknown

 

                          Ethnic Group              Unknown









Author







                          Organization              [facility]

 

                          [place] [address]

 

                          Phone                     [phone]









Allergies





Encounters





Medications





Problems







                     date                description         facility

 

                     2021            Other specified disorders of kidney and

  [facility] 

[employer]



                                        ureter              







Results

## 2021-07-07 ENCOUNTER — HOSPITAL ENCOUNTER (OUTPATIENT)
Dept: HOSPITAL 76 - SDS | Age: 56
Discharge: HOME | End: 2021-07-07
Attending: SURGERY
Payer: COMMERCIAL

## 2021-07-07 VITALS — SYSTOLIC BLOOD PRESSURE: 115 MMHG | DIASTOLIC BLOOD PRESSURE: 69 MMHG

## 2021-07-07 DIAGNOSIS — Z85.528: ICD-10-CM

## 2021-07-07 DIAGNOSIS — E11.9: ICD-10-CM

## 2021-07-07 DIAGNOSIS — Z87.19: ICD-10-CM

## 2021-07-07 DIAGNOSIS — Z79.899: ICD-10-CM

## 2021-07-07 DIAGNOSIS — K21.9: ICD-10-CM

## 2021-07-07 DIAGNOSIS — Z79.82: ICD-10-CM

## 2021-07-07 DIAGNOSIS — Z79.4: ICD-10-CM

## 2021-07-07 DIAGNOSIS — G47.30: ICD-10-CM

## 2021-07-07 DIAGNOSIS — Z90.5: ICD-10-CM

## 2021-07-07 DIAGNOSIS — I10: ICD-10-CM

## 2021-07-07 DIAGNOSIS — Z87.891: ICD-10-CM

## 2021-07-07 DIAGNOSIS — K40.90: Primary | ICD-10-CM

## 2021-07-07 DIAGNOSIS — E78.00: ICD-10-CM

## 2021-07-07 PROCEDURE — 49505 PRP I/HERN INIT REDUC >5 YR: CPT

## 2021-07-07 NOTE — ANESTHESIA POST OP EVALUATION
Anesthesia Post Eval





- Post Anesthesia Eval


Vitals: 





                                Last Vital Signs











Temp  36.5 C   07/07/21 10:04


 


Pulse  74   07/07/21 10:04


 


Resp  15   07/07/21 10:04


 


BP  115/69   07/07/21 10:04


 


Pulse Ox  96   07/07/21 10:04











CV Function Including HR & BP: Stable


Pain Control: Satisfactory


Nausea & Vomiting: Negative


Mental Status: Baseline


Respiratory Status: Airway Patent


Hydration Status: Satisfactory


Anesthesia Complications: None Mercedes Flap Text: The defect edges were debeveled with a #15 scalpel blade.  Given the location of the defect, shape of the defect and the proximity to free margins a Mercedes flap was deemed most appropriate.  Using a sterile surgical marker, an appropriate advancement flap was drawn incorporating the defect and placing the expected incisions within the relaxed skin tension lines where possible. The area thus outlined was incised deep to adipose tissue with a #15 scalpel blade.  The skin margins were undermined to an appropriate distance in all directions utilizing iris scissors.

## 2021-07-07 NOTE — OPERATIVE REPORT
Operative Report





- General


Procedure Date: 07/07/21


Planned Procedure: 





open right inguinal hernia repair with mesh


Pre-Op Diagnosis: right inguinal hernia 


Procedure Performed: 





right inguinal hernia direct


Post Op Diagnosis: same





- Procedure Note


Primary Surgeon: johan hoff


Anesthesia Technique: General ET tube, Local


Estimated Blood Loss (mL): 0


Complications: 





none





- Other


Other Information/Narrative: 





Patient was properly identified brought to the operating room and placed in 

supine position.  Sequential compression devices were placed.  General 

endotracheal anesthesia was induced. He was prepped and draped in a sterile 

fashion and given preoperative antibiotics.  Local anesthetic was given 

throughout the procedure.  A 5 cm incision was made in the direction of Martha's

lines just cephalad of the pubic tubercle.  Dissection proceeded with cutting cu

rrent cautery.  The superficial epigastric vein was identified clamped divided 

and tied with 3-0 Vicryl.  Dissection proceeded down to the aponeurosis.  The 

aponeurosis was opened in the direction of its fibers and extended to the 

external ring.  Cord structures were mobilized and brought up.  He had a 

branching ileoinguinal nerve which had to be removed to allow for repair.  The 

nerve was excised back to musculature.  Cord structures were mobilized and 

brought up.  A large direct hernia was identified.  The direct bulge was 

mobilized away from surrounding structures.  It was reduced and floor repaired 

with a 2-0 silk pursestring suture.  There was no indirect inguinal hernia.  

Polypropylene mesh was cut to size and with tails.  The mesh was secured with 

multiple interrupted 0 Ethibond sutures.  She was placed along the pubic 

tubercle, Justin's ligament area and along the shelving border of Poupart's 

ligament.  Sutures were placed medially along the abdominal wall musculature and

internal oblique.  The medial tail of the mesh was secured to the shelving 

border of Poupart's ligament with 3 interrupted 0 ethibond sutures recreating 

the internal ring of appropriate size.  An additional suture was placed in the 

crotch of the mesh recreating an internal ring of appropriate size.  Aponeurosis

was closed with a running 2-0 Vicryl suture.  The opposite was closed with 

interrupted 3-0 Vicryl suture.  Buried interrupted subdermal 3-0 Vicryl sutures 

were then placed.  And was closed with a running 4-0 Monocryl subcuticular 

suture.  Dressing was applied.  Patient was awakened and brought to recovery in 

good condition.

## 2021-07-07 NOTE — ANESTHESIA
Pre-Anesthesia VS, & Labs





- Diagnosis





r inguinal hernia





- Procedure





r inguinal hernia repair


Vital Signs: 





                                        











Temp Pulse Resp BP Pulse Ox


 


 36.2 C L  71   19   125/81 H  99 


 


 07/07/21 06:47  07/07/21 06:47  07/07/21 06:47  07/07/21 06:47  07/07/21 06:47











Height: 5 ft 10 in


Weight (kg): 85.5 kg


Body Mass Index: 27.0


BMI Classification: Overweight





- NPO


>8 hours





- Lab Results


Current Lab Results: 





Laboratory Tests





07/07/21 07:00: POC Whole Bld Glucose 172 H











Home Medications and Allergies





                                        





Aspirin [Aspir 81] 81 mg PO DAILY 01/05/14 


Atorvastatin Calcium [Lipitor] 10 mg PO HS 01/05/14 


Cetirizine HCl [Zyrtec] 10 mg PO DAILY 01/05/14 


Losartan Potassium 50 mg PO DAILY 11/06/17 


Cholecalciferol [Vitamin D3] 25 mcg PO DAILY 05/23/21 


Insulin Glargine [Lantus Solostar] 55 unit SUBQ DAILY 05/23/21 


Multivitamin [Theragran] 1 tab ORAL DAILY 05/23/21 


Senna [Senokot] 8.6 mg PO DAILY 05/23/21 


Insulin Aspart [NovoLOG] 12 unit SUBQ DAILY PM 06/09/21 


Pioglitazone [Actos] 45 mg PO DAILY 06/09/21 


polyethylene glycoL 3350 [Miralax] 17 gm PO DAILY PRN 06/09/21 








Allergies/Adverse Reactions: 


                                    Allergies











Allergy/AdvReac Type Severity Reaction Status Date / Time


 


lisinopril Allergy  flushing, Verified 07/07/21 07:02





   hot flashes  


 


semaglutide [From Ozempic] AdvReac  Nausea Verified 07/07/21 07:02














Anes History & Medical History





- Anesthetic History


Anesthesia Complications: reports: No previous complications


Family history of Anesthesia Complications: Denies


Family history of Malignant Hyperthermia: Denies





- Medical History


Cardiovascular: reports: High cholesterol


Pulmonary: reports: Sleep apnea, CPAP use


Gastrointestinal: reports: Diverticulitis, Other


Urinary: reports: Other (partial nephrectomy)


Neuro: reports: None


Musculoskeletal: reports: None


Endocrine/Autoimmune: reports: Type 2 diabetes


Blood Disorders: reports: None


Skin: reports: None


Smoking Status: Former smoker


History of Cancer?: Yes (renal CA)





- Surgical History


General: reports: Colonoscopy, EGD


Orthopedic: reports: Arthroscopic surgery


Dermatologic: reports: Debridement





Exam


General: Alert, Oriented x3, Cooperative


Dental: WNL


Mouth Opening: 3 Fingerbreadth


Neck Mobility: Normal


Mallampati classification: II


Thyromental Distance: less than 4 cm


Respiratory: Lungs clear


Cardiovascular: Regular rate





Plan


Anesthesia Type: General


Consent for Procedure(s) Verified and Reviewed: Yes


Code Status: Attempt Resuscitation


ASA classification: 2-Mild systemic disease


Is this case an emergency?: No